# Patient Record
Sex: FEMALE | Race: WHITE | NOT HISPANIC OR LATINO | URBAN - METROPOLITAN AREA
[De-identification: names, ages, dates, MRNs, and addresses within clinical notes are randomized per-mention and may not be internally consistent; named-entity substitution may affect disease eponyms.]

---

## 2017-04-03 ENCOUNTER — OUTPATIENT (OUTPATIENT)
Dept: OUTPATIENT SERVICES | Facility: HOSPITAL | Age: 72
LOS: 1 days | Discharge: HOME | End: 2017-04-03
Payer: MEDICARE

## 2017-04-03 DIAGNOSIS — E89.0 POSTPROCEDURAL HYPOTHYROIDISM: ICD-10-CM

## 2017-04-03 DIAGNOSIS — E04.2 NONTOXIC MULTINODULAR GOITER: ICD-10-CM

## 2017-04-03 PROCEDURE — 76536 US EXAM OF HEAD AND NECK: CPT | Mod: 26

## 2023-11-18 ENCOUNTER — INPATIENT (INPATIENT)
Facility: HOSPITAL | Age: 78
LOS: 3 days | Discharge: NURSING, REHAB OR LONG-TERM CARE | DRG: 480 | End: 2023-11-22
Attending: HOSPITALIST | Admitting: STUDENT IN AN ORGANIZED HEALTH CARE EDUCATION/TRAINING PROGRAM
Payer: MEDICARE

## 2023-11-18 VITALS
HEART RATE: 77 BPM | OXYGEN SATURATION: 93 % | DIASTOLIC BLOOD PRESSURE: 87 MMHG | RESPIRATION RATE: 18 BRPM | SYSTOLIC BLOOD PRESSURE: 143 MMHG | TEMPERATURE: 98 F | WEIGHT: 139.99 LBS | HEIGHT: 67 IN

## 2023-11-18 DIAGNOSIS — K21.9 GASTRO-ESOPHAGEAL REFLUX DISEASE WITHOUT ESOPHAGITIS: ICD-10-CM

## 2023-11-18 LAB
ABO RH CONFIRMATION: SIGNIFICANT CHANGE UP
ABO RH CONFIRMATION: SIGNIFICANT CHANGE UP
ALBUMIN SERPL ELPH-MCNC: 3.4 G/DL — SIGNIFICANT CHANGE UP (ref 3.3–5)
ALBUMIN SERPL ELPH-MCNC: 3.4 G/DL — SIGNIFICANT CHANGE UP (ref 3.3–5)
ALP SERPL-CCNC: 77 U/L — SIGNIFICANT CHANGE UP (ref 40–120)
ALP SERPL-CCNC: 77 U/L — SIGNIFICANT CHANGE UP (ref 40–120)
ALT FLD-CCNC: 11 U/L — LOW (ref 12–78)
ALT FLD-CCNC: 11 U/L — LOW (ref 12–78)
ANION GAP SERPL CALC-SCNC: 5 MMOL/L — SIGNIFICANT CHANGE UP (ref 5–17)
ANION GAP SERPL CALC-SCNC: 5 MMOL/L — SIGNIFICANT CHANGE UP (ref 5–17)
APTT BLD: 29.7 SEC — SIGNIFICANT CHANGE UP (ref 24.5–35.6)
APTT BLD: 29.7 SEC — SIGNIFICANT CHANGE UP (ref 24.5–35.6)
AST SERPL-CCNC: 11 U/L — LOW (ref 15–37)
AST SERPL-CCNC: 11 U/L — LOW (ref 15–37)
BASOPHILS # BLD AUTO: 0.03 K/UL — SIGNIFICANT CHANGE UP (ref 0–0.2)
BASOPHILS # BLD AUTO: 0.03 K/UL — SIGNIFICANT CHANGE UP (ref 0–0.2)
BASOPHILS NFR BLD AUTO: 0.2 % — SIGNIFICANT CHANGE UP (ref 0–2)
BASOPHILS NFR BLD AUTO: 0.2 % — SIGNIFICANT CHANGE UP (ref 0–2)
BILIRUB SERPL-MCNC: 0.3 MG/DL — SIGNIFICANT CHANGE UP (ref 0.2–1.2)
BILIRUB SERPL-MCNC: 0.3 MG/DL — SIGNIFICANT CHANGE UP (ref 0.2–1.2)
BLD GP AB SCN SERPL QL: SIGNIFICANT CHANGE UP
BLD GP AB SCN SERPL QL: SIGNIFICANT CHANGE UP
BUN SERPL-MCNC: 18 MG/DL — SIGNIFICANT CHANGE UP (ref 7–23)
BUN SERPL-MCNC: 18 MG/DL — SIGNIFICANT CHANGE UP (ref 7–23)
CALCIUM SERPL-MCNC: 9 MG/DL — SIGNIFICANT CHANGE UP (ref 8.5–10.1)
CALCIUM SERPL-MCNC: 9 MG/DL — SIGNIFICANT CHANGE UP (ref 8.5–10.1)
CHLORIDE SERPL-SCNC: 106 MMOL/L — SIGNIFICANT CHANGE UP (ref 96–108)
CHLORIDE SERPL-SCNC: 106 MMOL/L — SIGNIFICANT CHANGE UP (ref 96–108)
CO2 SERPL-SCNC: 27 MMOL/L — SIGNIFICANT CHANGE UP (ref 22–31)
CO2 SERPL-SCNC: 27 MMOL/L — SIGNIFICANT CHANGE UP (ref 22–31)
CREAT SERPL-MCNC: 0.79 MG/DL — SIGNIFICANT CHANGE UP (ref 0.5–1.3)
CREAT SERPL-MCNC: 0.79 MG/DL — SIGNIFICANT CHANGE UP (ref 0.5–1.3)
EGFR: 77 ML/MIN/1.73M2 — SIGNIFICANT CHANGE UP
EGFR: 77 ML/MIN/1.73M2 — SIGNIFICANT CHANGE UP
EOSINOPHIL # BLD AUTO: 0.06 K/UL — SIGNIFICANT CHANGE UP (ref 0–0.5)
EOSINOPHIL # BLD AUTO: 0.06 K/UL — SIGNIFICANT CHANGE UP (ref 0–0.5)
EOSINOPHIL NFR BLD AUTO: 0.4 % — SIGNIFICANT CHANGE UP (ref 0–6)
EOSINOPHIL NFR BLD AUTO: 0.4 % — SIGNIFICANT CHANGE UP (ref 0–6)
GLUCOSE SERPL-MCNC: 149 MG/DL — HIGH (ref 70–99)
GLUCOSE SERPL-MCNC: 149 MG/DL — HIGH (ref 70–99)
HCT VFR BLD CALC: 40.4 % — SIGNIFICANT CHANGE UP (ref 34.5–45)
HCT VFR BLD CALC: 40.4 % — SIGNIFICANT CHANGE UP (ref 34.5–45)
HGB BLD-MCNC: 13.4 G/DL — SIGNIFICANT CHANGE UP (ref 11.5–15.5)
HGB BLD-MCNC: 13.4 G/DL — SIGNIFICANT CHANGE UP (ref 11.5–15.5)
IMM GRANULOCYTES NFR BLD AUTO: 0.6 % — SIGNIFICANT CHANGE UP (ref 0–0.9)
IMM GRANULOCYTES NFR BLD AUTO: 0.6 % — SIGNIFICANT CHANGE UP (ref 0–0.9)
INR BLD: 1.01 RATIO — SIGNIFICANT CHANGE UP (ref 0.85–1.18)
INR BLD: 1.01 RATIO — SIGNIFICANT CHANGE UP (ref 0.85–1.18)
LACTATE SERPL-SCNC: 1.2 MMOL/L — SIGNIFICANT CHANGE UP (ref 0.7–2)
LACTATE SERPL-SCNC: 1.2 MMOL/L — SIGNIFICANT CHANGE UP (ref 0.7–2)
LYMPHOCYTES # BLD AUTO: 0.91 K/UL — LOW (ref 1–3.3)
LYMPHOCYTES # BLD AUTO: 0.91 K/UL — LOW (ref 1–3.3)
LYMPHOCYTES # BLD AUTO: 6.3 % — LOW (ref 13–44)
LYMPHOCYTES # BLD AUTO: 6.3 % — LOW (ref 13–44)
MCHC RBC-ENTMCNC: 29.4 PG — SIGNIFICANT CHANGE UP (ref 27–34)
MCHC RBC-ENTMCNC: 29.4 PG — SIGNIFICANT CHANGE UP (ref 27–34)
MCHC RBC-ENTMCNC: 33.2 GM/DL — SIGNIFICANT CHANGE UP (ref 32–36)
MCHC RBC-ENTMCNC: 33.2 GM/DL — SIGNIFICANT CHANGE UP (ref 32–36)
MCV RBC AUTO: 88.6 FL — SIGNIFICANT CHANGE UP (ref 80–100)
MCV RBC AUTO: 88.6 FL — SIGNIFICANT CHANGE UP (ref 80–100)
MONOCYTES # BLD AUTO: 0.65 K/UL — SIGNIFICANT CHANGE UP (ref 0–0.9)
MONOCYTES # BLD AUTO: 0.65 K/UL — SIGNIFICANT CHANGE UP (ref 0–0.9)
MONOCYTES NFR BLD AUTO: 4.5 % — SIGNIFICANT CHANGE UP (ref 2–14)
MONOCYTES NFR BLD AUTO: 4.5 % — SIGNIFICANT CHANGE UP (ref 2–14)
NEUTROPHILS # BLD AUTO: 12.79 K/UL — HIGH (ref 1.8–7.4)
NEUTROPHILS # BLD AUTO: 12.79 K/UL — HIGH (ref 1.8–7.4)
NEUTROPHILS NFR BLD AUTO: 88 % — HIGH (ref 43–77)
NEUTROPHILS NFR BLD AUTO: 88 % — HIGH (ref 43–77)
PLATELET # BLD AUTO: 307 K/UL — SIGNIFICANT CHANGE UP (ref 150–400)
PLATELET # BLD AUTO: 307 K/UL — SIGNIFICANT CHANGE UP (ref 150–400)
POTASSIUM SERPL-MCNC: 3.8 MMOL/L — SIGNIFICANT CHANGE UP (ref 3.5–5.3)
POTASSIUM SERPL-MCNC: 3.8 MMOL/L — SIGNIFICANT CHANGE UP (ref 3.5–5.3)
POTASSIUM SERPL-SCNC: 3.8 MMOL/L — SIGNIFICANT CHANGE UP (ref 3.5–5.3)
POTASSIUM SERPL-SCNC: 3.8 MMOL/L — SIGNIFICANT CHANGE UP (ref 3.5–5.3)
PROT SERPL-MCNC: 7.5 GM/DL — SIGNIFICANT CHANGE UP (ref 6–8.3)
PROT SERPL-MCNC: 7.5 GM/DL — SIGNIFICANT CHANGE UP (ref 6–8.3)
PROTHROM AB SERPL-ACNC: 11.4 SEC — SIGNIFICANT CHANGE UP (ref 9.5–13)
PROTHROM AB SERPL-ACNC: 11.4 SEC — SIGNIFICANT CHANGE UP (ref 9.5–13)
RBC # BLD: 4.56 M/UL — SIGNIFICANT CHANGE UP (ref 3.8–5.2)
RBC # BLD: 4.56 M/UL — SIGNIFICANT CHANGE UP (ref 3.8–5.2)
RBC # FLD: 13.7 % — SIGNIFICANT CHANGE UP (ref 10.3–14.5)
RBC # FLD: 13.7 % — SIGNIFICANT CHANGE UP (ref 10.3–14.5)
SODIUM SERPL-SCNC: 138 MMOL/L — SIGNIFICANT CHANGE UP (ref 135–145)
SODIUM SERPL-SCNC: 138 MMOL/L — SIGNIFICANT CHANGE UP (ref 135–145)
TROPONIN I, HIGH SENSITIVITY RESULT: 6.22 NG/L — SIGNIFICANT CHANGE UP
TROPONIN I, HIGH SENSITIVITY RESULT: 6.22 NG/L — SIGNIFICANT CHANGE UP
WBC # BLD: 14.52 K/UL — HIGH (ref 3.8–10.5)
WBC # BLD: 14.52 K/UL — HIGH (ref 3.8–10.5)
WBC # FLD AUTO: 14.52 K/UL — HIGH (ref 3.8–10.5)
WBC # FLD AUTO: 14.52 K/UL — HIGH (ref 3.8–10.5)

## 2023-11-18 PROCEDURE — 86923 COMPATIBILITY TEST ELECTRIC: CPT

## 2023-11-18 PROCEDURE — 76376 3D RENDER W/INTRP POSTPROCES: CPT | Mod: 26

## 2023-11-18 PROCEDURE — 87635 SARS-COV-2 COVID-19 AMP PRB: CPT

## 2023-11-18 PROCEDURE — 73562 X-RAY EXAM OF KNEE 3: CPT | Mod: 26,RT

## 2023-11-18 PROCEDURE — 85610 PROTHROMBIN TIME: CPT

## 2023-11-18 PROCEDURE — 85014 HEMATOCRIT: CPT

## 2023-11-18 PROCEDURE — 97166 OT EVAL MOD COMPLEX 45 MIN: CPT | Mod: GO

## 2023-11-18 PROCEDURE — 86803 HEPATITIS C AB TEST: CPT

## 2023-11-18 PROCEDURE — 85730 THROMBOPLASTIN TIME PARTIAL: CPT

## 2023-11-18 PROCEDURE — 85018 HEMOGLOBIN: CPT

## 2023-11-18 PROCEDURE — 76000 FLUOROSCOPY <1 HR PHYS/QHP: CPT

## 2023-11-18 PROCEDURE — 83036 HEMOGLOBIN GLYCOSYLATED A1C: CPT

## 2023-11-18 PROCEDURE — 93010 ELECTROCARDIOGRAM REPORT: CPT

## 2023-11-18 PROCEDURE — 82040 ASSAY OF SERUM ALBUMIN: CPT

## 2023-11-18 PROCEDURE — 73501 X-RAY EXAM HIP UNI 1 VIEW: CPT | Mod: 26,RT,76

## 2023-11-18 PROCEDURE — 86901 BLOOD TYPING SEROLOGIC RH(D): CPT

## 2023-11-18 PROCEDURE — 85027 COMPLETE CBC AUTOMATED: CPT

## 2023-11-18 PROCEDURE — 99285 EMERGENCY DEPT VISIT HI MDM: CPT

## 2023-11-18 PROCEDURE — 72192 CT PELVIS W/O DYE: CPT | Mod: 26,MA

## 2023-11-18 PROCEDURE — 97530 THERAPEUTIC ACTIVITIES: CPT | Mod: GP

## 2023-11-18 PROCEDURE — 99222 1ST HOSP IP/OBS MODERATE 55: CPT

## 2023-11-18 PROCEDURE — 82306 VITAMIN D 25 HYDROXY: CPT

## 2023-11-18 PROCEDURE — 36430 TRANSFUSION BLD/BLD COMPNT: CPT

## 2023-11-18 PROCEDURE — 73501 X-RAY EXAM HIP UNI 1 VIEW: CPT | Mod: RT

## 2023-11-18 PROCEDURE — 86900 BLOOD TYPING SEROLOGIC ABO: CPT

## 2023-11-18 PROCEDURE — 36415 COLL VENOUS BLD VENIPUNCTURE: CPT

## 2023-11-18 PROCEDURE — 97116 GAIT TRAINING THERAPY: CPT | Mod: GP

## 2023-11-18 PROCEDURE — 97162 PT EVAL MOD COMPLEX 30 MIN: CPT | Mod: GP

## 2023-11-18 PROCEDURE — P9040: CPT

## 2023-11-18 PROCEDURE — 86850 RBC ANTIBODY SCREEN: CPT

## 2023-11-18 PROCEDURE — 80048 BASIC METABOLIC PNL TOTAL CA: CPT

## 2023-11-18 PROCEDURE — 71045 X-RAY EXAM CHEST 1 VIEW: CPT | Mod: 26

## 2023-11-18 PROCEDURE — 73552 X-RAY EXAM OF FEMUR 2/>: CPT | Mod: 26,RT

## 2023-11-18 PROCEDURE — C1713: CPT

## 2023-11-18 RX ORDER — METOPROLOL TARTRATE 50 MG
50 TABLET ORAL DAILY
Refills: 0 | Status: DISCONTINUED | OUTPATIENT
Start: 2023-11-18 | End: 2023-11-22

## 2023-11-18 RX ORDER — DONEPEZIL HYDROCHLORIDE 10 MG/1
10 TABLET, FILM COATED ORAL AT BEDTIME
Refills: 0 | Status: DISCONTINUED | OUTPATIENT
Start: 2023-11-18 | End: 2023-11-22

## 2023-11-18 RX ORDER — MORPHINE SULFATE 50 MG/1
2 CAPSULE, EXTENDED RELEASE ORAL ONCE
Refills: 0 | Status: DISCONTINUED | OUTPATIENT
Start: 2023-11-18 | End: 2023-11-18

## 2023-11-18 RX ORDER — METOPROLOL TARTRATE 50 MG
1 TABLET ORAL
Refills: 0 | DISCHARGE

## 2023-11-18 RX ORDER — PREGABALIN 225 MG/1
500 CAPSULE ORAL DAILY
Refills: 0 | Status: DISCONTINUED | OUTPATIENT
Start: 2023-11-18 | End: 2023-11-22

## 2023-11-18 RX ORDER — SODIUM CHLORIDE 9 MG/ML
1000 INJECTION INTRAMUSCULAR; INTRAVENOUS; SUBCUTANEOUS
Refills: 0 | Status: DISCONTINUED | OUTPATIENT
Start: 2023-11-18 | End: 2023-11-20

## 2023-11-18 RX ORDER — SERTRALINE 25 MG/1
1 TABLET, FILM COATED ORAL
Refills: 0 | DISCHARGE

## 2023-11-18 RX ORDER — FAMOTIDINE 10 MG/ML
1 INJECTION INTRAVENOUS
Refills: 0 | DISCHARGE

## 2023-11-18 RX ORDER — PREGABALIN 225 MG/1
1 CAPSULE ORAL
Refills: 0 | DISCHARGE

## 2023-11-18 RX ORDER — CHOLECALCIFEROL (VITAMIN D3) 125 MCG
1 CAPSULE ORAL
Refills: 0 | DISCHARGE

## 2023-11-18 RX ORDER — ERGOCALCIFEROL 1.25 MG/1
50000 CAPSULE ORAL
Refills: 0 | Status: DISCONTINUED | OUTPATIENT
Start: 2023-11-18 | End: 2023-11-22

## 2023-11-18 RX ORDER — DONEPEZIL HYDROCHLORIDE 10 MG/1
1 TABLET, FILM COATED ORAL
Refills: 0 | DISCHARGE

## 2023-11-18 RX ORDER — MORPHINE SULFATE 50 MG/1
2 CAPSULE, EXTENDED RELEASE ORAL EVERY 4 HOURS
Refills: 0 | Status: DISCONTINUED | OUTPATIENT
Start: 2023-11-18 | End: 2023-11-20

## 2023-11-18 RX ORDER — HEPARIN SODIUM 5000 [USP'U]/ML
5000 INJECTION INTRAVENOUS; SUBCUTANEOUS ONCE
Refills: 0 | Status: COMPLETED | OUTPATIENT
Start: 2023-11-18 | End: 2023-11-18

## 2023-11-18 RX ORDER — LEVOTHYROXINE SODIUM 125 MCG
1 TABLET ORAL
Refills: 0 | DISCHARGE

## 2023-11-18 RX ORDER — ACETAMINOPHEN 500 MG
1000 TABLET ORAL EVERY 8 HOURS
Refills: 0 | Status: DISCONTINUED | OUTPATIENT
Start: 2023-11-18 | End: 2023-11-20

## 2023-11-18 RX ORDER — MORPHINE SULFATE 50 MG/1
1 CAPSULE, EXTENDED RELEASE ORAL EVERY 4 HOURS
Refills: 0 | Status: DISCONTINUED | OUTPATIENT
Start: 2023-11-18 | End: 2023-11-20

## 2023-11-18 RX ORDER — LANOLIN ALCOHOL/MO/W.PET/CERES
3 CREAM (GRAM) TOPICAL AT BEDTIME
Refills: 0 | Status: DISCONTINUED | OUTPATIENT
Start: 2023-11-18 | End: 2023-11-19

## 2023-11-18 RX ORDER — FOLIC ACID 0.8 MG
1 TABLET ORAL
Refills: 0 | DISCHARGE

## 2023-11-18 RX ORDER — FAMOTIDINE 10 MG/ML
40 INJECTION INTRAVENOUS AT BEDTIME
Refills: 0 | Status: DISCONTINUED | OUTPATIENT
Start: 2023-11-18 | End: 2023-11-22

## 2023-11-18 RX ORDER — ERGOCALCIFEROL 1.25 MG/1
1 CAPSULE ORAL
Refills: 0 | DISCHARGE

## 2023-11-18 RX ORDER — POLYETHYLENE GLYCOL 3350 17 G/17G
17 POWDER, FOR SOLUTION ORAL DAILY
Refills: 0 | Status: DISCONTINUED | OUTPATIENT
Start: 2023-11-18 | End: 2023-11-19

## 2023-11-18 RX ORDER — NALOXONE HYDROCHLORIDE 4 MG/.1ML
0.4 SPRAY NASAL ONCE
Refills: 0 | Status: DISCONTINUED | OUTPATIENT
Start: 2023-11-18 | End: 2023-11-22

## 2023-11-18 RX ORDER — CALCIUM CARBONATE 500(1250)
2 TABLET ORAL DAILY
Refills: 0 | Status: DISCONTINUED | OUTPATIENT
Start: 2023-11-18 | End: 2023-11-22

## 2023-11-18 RX ORDER — FOLIC ACID 0.8 MG
1 TABLET ORAL DAILY
Refills: 0 | Status: DISCONTINUED | OUTPATIENT
Start: 2023-11-18 | End: 2023-11-22

## 2023-11-18 RX ORDER — SERTRALINE 25 MG/1
100 TABLET, FILM COATED ORAL DAILY
Refills: 0 | Status: DISCONTINUED | OUTPATIENT
Start: 2023-11-18 | End: 2023-11-22

## 2023-11-18 RX ORDER — SENNA PLUS 8.6 MG/1
2 TABLET ORAL AT BEDTIME
Refills: 0 | Status: DISCONTINUED | OUTPATIENT
Start: 2023-11-18 | End: 2023-11-19

## 2023-11-18 RX ORDER — ACETAMINOPHEN 500 MG
1 TABLET ORAL
Refills: 0 | DISCHARGE

## 2023-11-18 RX ORDER — LEVOTHYROXINE SODIUM 125 MCG
50 TABLET ORAL DAILY
Refills: 0 | Status: DISCONTINUED | OUTPATIENT
Start: 2023-11-18 | End: 2023-11-22

## 2023-11-18 RX ORDER — ONDANSETRON 8 MG/1
4 TABLET, FILM COATED ORAL EVERY 8 HOURS
Refills: 0 | Status: DISCONTINUED | OUTPATIENT
Start: 2023-11-18 | End: 2023-11-22

## 2023-11-18 RX ADMIN — SODIUM CHLORIDE 75 MILLILITER(S): 9 INJECTION INTRAMUSCULAR; INTRAVENOUS; SUBCUTANEOUS at 23:35

## 2023-11-18 RX ADMIN — MORPHINE SULFATE 2 MILLIGRAM(S): 50 CAPSULE, EXTENDED RELEASE ORAL at 15:08

## 2023-11-18 RX ADMIN — HEPARIN SODIUM 5000 UNIT(S): 5000 INJECTION INTRAVENOUS; SUBCUTANEOUS at 20:12

## 2023-11-18 RX ADMIN — MORPHINE SULFATE 2 MILLIGRAM(S): 50 CAPSULE, EXTENDED RELEASE ORAL at 18:56

## 2023-11-18 RX ADMIN — SODIUM CHLORIDE 75 MILLILITER(S): 9 INJECTION INTRAMUSCULAR; INTRAVENOUS; SUBCUTANEOUS at 20:12

## 2023-11-18 NOTE — ED ADULT TRIAGE NOTE - CHIEF COMPLAINT QUOTE
Pt presents to er with complaints of mechanical fall out of bed onto her right hip/leg, denies head strike/loc, use of AC, pt denies abd pain, chest pain, sob at this time, pt normally is independent and walks with rolling walker from facility.

## 2023-11-18 NOTE — ED ADULT NURSE NOTE - OBJECTIVE STATEMENT
pt presents to ED s/p fall today. unclear how she fell. denies headstrike, blood thinners. c/o R upper leg pain. was not ambulatory after fall. GCS 14. confused about events of fall.

## 2023-11-18 NOTE — ED PROVIDER NOTE - CLINICAL SUMMARY MEDICAL DECISION MAKING FREE TEXT BOX
Elderly female with dementia comes in found down at the [facility]. Clinically appears to have fractured hip. Vitals normal. No signs of head injury. XR, labs, meds, anticipate ortho admission.

## 2023-11-18 NOTE — ED PROVIDER NOTE - OBJECTIVE STATEMENT
77 yo female w/PMHx of OP, GERD, HTN, dementia presents to the ED BIBEMS from [facility] s/p fall. Pt usually is able to ambulate with a cane. Pt was getting out of bed when she fell. Pt c/o right hip pain Allergies to penicillin. Pt is DNR. Pt is not on blood thinners. Pt is on ASA as needed but is not on [facility]'s daily med list. Denies head strike, LOC. Denies CP, nausea, vomiting or any other symptoms.

## 2023-11-18 NOTE — PHARMACOTHERAPY INTERVENTION NOTE - COMMENTS
Medication history complete. Medications and allergies reviewed with patient and confirmed with .  Medication history complete. Medications and allergies reviewed with list provided by [facility] and confirmed with .

## 2023-11-18 NOTE — H&P ADULT - HISTORY OF PRESENT ILLNESS
79 yo F w/ PMHx HTN, dementia, osteoporosis, GERD presents to the ER from [facility] after a likely mechanical fall. As per documents witnessed fal lout of bed however patient does not remember how it occurred. She states at home she usually does not need to use a cane but sometimes she has to. She currently states pain is well controlled, denies any other complaints now or at home including chest pain, sob, nausea/vomiting, dizziness, palpitations.    Surg hx: unknown  Social hx: no alcohol, tobacco or drug use  Fam hx: unknown

## 2023-11-18 NOTE — H&P ADULT - NSHPPHYSICALEXAM_GEN_ALL_CORE
Physical Exam  General: no acute distress  HEENT: PERRLA  Skin: dry, warm, no rashes  Neuro: aaox3, cranial nerves 1-12 wnl, no focal deficits   Neck: no masses or deformities  Cardiac: positive S1 and S2, regular rate and rhythm, no gallops/rubs/murmurs  Pulmonary: normal work of breathing, lungs clear to auscultation bilaterally  Abdomen: soft, nondistended, nontender  Extremities: no significant edema or varicosities, 2+ palpable pulses bilaterally  MSK: RLE EXTERNALLY ROTATE TENDER TO PALPATION   Psychiatry: appropriate affect

## 2023-11-18 NOTE — H&P ADULT - NSHPREVIEWOFSYSTEMS_GEN_ALL_CORE
General: no fatigue or weakness    Ophthalmologic: normal vision, no blurring or double vision  	  ENMT: no cough, throat pain, no tinnitus or decreased hearing	    Respiratory and Thorax: no shortness of breath, no chest pain  	  Cardiovascular: no chest pain no palpitations    Gastrointestinal: no nausea or vomiting, no diarrhea, no constipation. no abdominal pain    Genitourinary: no dysuria, no urinary symptoms    Musculoskeletal: +RLE PAIN, no weakness    Neurological: no dizziness, no headache	    Psychiatric: no anxiety or depression	    Hematology/Lymphatics: no fevers, no night sweats or weight loss

## 2023-11-18 NOTE — ED ADULT NURSE NOTE - NSFALLRISKINTERV_ED_ALL_ED

## 2023-11-18 NOTE — H&P ADULT - ASSESSMENT
HPI:  77 yo F w/ PMHx HTN, dementia, osteoporosis, GERD presents to the ER from [facility] after a likely mechanical fall. As per documents witnessed fal lout of bed however patient does not remember how it occurred. She states at home she usually does not need to use a cane but sometimes she has to. She currently states pain is well controlled, denies any other complaints now or at home including chest pain, sob, nausea/vomiting, dizziness, palpitations.    Surg hx: unknown  Social hx: no alcohol, tobacco or drug use  Fam hx: unknown (18 Nov 2023 22:20)      PAST MEDICAL & SURGICAL HISTORY:  GERD (gastroesophageal reflux disease)      MEDICATIONS  (STANDING):  acetaminophen     Tablet .. 1000 milliGRAM(s) Oral every 8 hours  calcium carbonate    500 mG (Tums) Chewable 2 Tablet(s) Chew daily  cyanocobalamin 500 MICROGram(s) Oral daily  donepezil 10 milliGRAM(s) Oral at bedtime  ergocalciferol 27661 Unit(s) Oral every week  famotidine    Tablet 40 milliGRAM(s) Oral at bedtime  folic acid 1 milliGRAM(s) Oral daily  levothyroxine 50 MICROGram(s) Oral daily  metoprolol succinate ER 50 milliGRAM(s) Oral daily  naloxone Injectable 0.4 milliGRAM(s) IV Push once  polyethylene glycol 3350 17 Gram(s) Oral daily  senna 2 Tablet(s) Oral at bedtime  sertraline 100 milliGRAM(s) Oral daily  sodium chloride 0.9%. 1000 milliLiter(s) (75 mL/Hr) IV Continuous <Continuous>    MEDICATIONS  (PRN):  aluminum hydroxide/magnesium hydroxide/simethicone Suspension 30 milliLiter(s) Oral every 4 hours PRN Dyspepsia  bisacodyl 5 milliGRAM(s) Oral daily PRN Constipation  melatonin 3 milliGRAM(s) Oral at bedtime PRN Insomnia  morphine  - Injectable 1 milliGRAM(s) IV Push every 4 hours PRN Moderate Pain (4 - 6)  morphine  - Injectable 2 milliGRAM(s) IV Push every 4 hours PRN Severe Pain (7 - 10)  ondansetron Injectable 4 milliGRAM(s) IV Push every 8 hours PRN Nausea and/or Vomiting      Allergies    penicillins (Unknown)    Intolerances        SOCIAL HISTORY:    FAMILY HISTORY:      Vital Signs Last 24 Hrs  T(C): 37.1 (18 Nov 2023 20:50), Max: 37.1 (18 Nov 2023 20:50)  T(F): 98.8 (18 Nov 2023 20:50), Max: 98.8 (18 Nov 2023 20:50)  HR: 88 (18 Nov 2023 20:50) (77 - 88)  BP: 119/79 (18 Nov 2023 20:50) (111/82 - 143/87)  BP(mean): 82 (18 Nov 2023 20:21) (82 - 89)  RR: 17 (18 Nov 2023 20:50) (16 - 18)  SpO2: 93% (18 Nov 2023 20:50) (93% - 93%)    Parameters below as of 18 Nov 2023 20:50  Patient On (Oxygen Delivery Method): room air      LABS:                        13.4   14.52 )-----------( 307      ( 18 Nov 2023 14:53 )             40.4     11-18    138  |  106  |  18  ----------------------------<  149<H>  3.8   |  27  |  0.79    Ca    9.0      18 Nov 2023 14:53    TPro  7.5  /  Alb  3.4  /  TBili  0.3  /  DBili  x   /  AST  11<L>  /  ALT  11<L>  /  AlkPhos  77  11-18    PT/INR - ( 18 Nov 2023 14:53 )   PT: 11.4 sec;   INR: 1.01 ratio         PTT - ( 18 Nov 2023 14:53 )  PTT:29.7 sec  Urinalysis Basic - ( 18 Nov 2023 14:53 )    Color: x / Appearance: x / SG: x / pH: x  Gluc: 149 mg/dL / Ketone: x  / Bili: x / Urobili: x   Blood: x / Protein: x / Nitrite: x   Leuk Esterase: x / RBC: x / WBC x   Sq Epi: x / Non Sq Epi: x / Bacteria: x        RADIOLOGY & ADDITIONAL STUDIES:    EKG: NSR LAE nonspecific TWI     Plan:    1. MSK  -R hip fracture s/p mechanical fall out of bed planned for IMN in AM with orthopedics. METS<4 as patient is partially dependent and ambulates with cane at baseline, gerisensitive RCRI estimates perioperative risk of MACE at 0.3%, EKG reviewed, patient is low risk for intermediate risk procedure and may proceed without further testing. Will need temporary DNR reversal by surgical team   -pain control with tylenol and prn morphine  -heparin subq held after midnight, npo after midnight, bowel regimen while on opiate meds    2. HTN  -controlled on home dose toprol 50mg daily    3. Hypothyroidism; clinically euhtyroid cont home dose synthroid 50mcg daily    4. Neuro  -hx of dementia and depression, cont home dose aricept and zoloft    5. GERD  -controlled on H2 blockers famotidine 40mg daily     6. Leukocytosis likely reactive in setting of hip fracture, no clinical s/s of infection will monitor     DVT prophylaxis held until surgery, post op as per surgical team  NPO after midnight, cardiac diet after surgery  dispo: [facility] after cleared by PT    HPI:  79 yo F w/ PMHx HTN, dementia, osteoporosis, GERD presents to the ER from [facility] after a likely mechanical fall. As per documents witnessed fal lout of bed however patient does not remember how it occurred. She states at home she usually does not need to use a cane but sometimes she has to. She currently states pain is well controlled, denies any other complaints now or at home including chest pain, sob, nausea/vomiting, dizziness, palpitations.    Surg hx: unknown  Social hx: no alcohol, tobacco or drug use  Fam hx: unknown (18 Nov 2023 22:20)      PAST MEDICAL & SURGICAL HISTORY:  GERD (gastroesophageal reflux disease)      MEDICATIONS  (STANDING):  acetaminophen     Tablet .. 1000 milliGRAM(s) Oral every 8 hours  calcium carbonate    500 mG (Tums) Chewable 2 Tablet(s) Chew daily  cyanocobalamin 500 MICROGram(s) Oral daily  donepezil 10 milliGRAM(s) Oral at bedtime  ergocalciferol 03072 Unit(s) Oral every week  famotidine    Tablet 40 milliGRAM(s) Oral at bedtime  folic acid 1 milliGRAM(s) Oral daily  levothyroxine 50 MICROGram(s) Oral daily  metoprolol succinate ER 50 milliGRAM(s) Oral daily  naloxone Injectable 0.4 milliGRAM(s) IV Push once  polyethylene glycol 3350 17 Gram(s) Oral daily  senna 2 Tablet(s) Oral at bedtime  sertraline 100 milliGRAM(s) Oral daily  sodium chloride 0.9%. 1000 milliLiter(s) (75 mL/Hr) IV Continuous <Continuous>    MEDICATIONS  (PRN):  aluminum hydroxide/magnesium hydroxide/simethicone Suspension 30 milliLiter(s) Oral every 4 hours PRN Dyspepsia  bisacodyl 5 milliGRAM(s) Oral daily PRN Constipation  melatonin 3 milliGRAM(s) Oral at bedtime PRN Insomnia  morphine  - Injectable 1 milliGRAM(s) IV Push every 4 hours PRN Moderate Pain (4 - 6)  morphine  - Injectable 2 milliGRAM(s) IV Push every 4 hours PRN Severe Pain (7 - 10)  ondansetron Injectable 4 milliGRAM(s) IV Push every 8 hours PRN Nausea and/or Vomiting      Allergies    penicillins (Unknown)    Intolerances        SOCIAL HISTORY:    FAMILY HISTORY:      Vital Signs Last 24 Hrs  T(C): 37.1 (18 Nov 2023 20:50), Max: 37.1 (18 Nov 2023 20:50)  T(F): 98.8 (18 Nov 2023 20:50), Max: 98.8 (18 Nov 2023 20:50)  HR: 88 (18 Nov 2023 20:50) (77 - 88)  BP: 119/79 (18 Nov 2023 20:50) (111/82 - 143/87)  BP(mean): 82 (18 Nov 2023 20:21) (82 - 89)  RR: 17 (18 Nov 2023 20:50) (16 - 18)  SpO2: 93% (18 Nov 2023 20:50) (93% - 93%)    Parameters below as of 18 Nov 2023 20:50  Patient On (Oxygen Delivery Method): room air      LABS:                        13.4   14.52 )-----------( 307      ( 18 Nov 2023 14:53 )             40.4     11-18    138  |  106  |  18  ----------------------------<  149<H>  3.8   |  27  |  0.79    Ca    9.0      18 Nov 2023 14:53    TPro  7.5  /  Alb  3.4  /  TBili  0.3  /  DBili  x   /  AST  11<L>  /  ALT  11<L>  /  AlkPhos  77  11-18    PT/INR - ( 18 Nov 2023 14:53 )   PT: 11.4 sec;   INR: 1.01 ratio         PTT - ( 18 Nov 2023 14:53 )  PTT:29.7 sec  Urinalysis Basic - ( 18 Nov 2023 14:53 )    Color: x / Appearance: x / SG: x / pH: x  Gluc: 149 mg/dL / Ketone: x  / Bili: x / Urobili: x   Blood: x / Protein: x / Nitrite: x   Leuk Esterase: x / RBC: x / WBC x   Sq Epi: x / Non Sq Epi: x / Bacteria: x        RADIOLOGY & ADDITIONAL STUDIES:    EKG: NSR LAE nonspecific TWI     Plan:    1. MSK  -R hip fracture s/p mechanical fall out of bed planned for IMN in AM with orthopedics. METS<4 as patient is partially dependent and ambulates with cane at baseline, gerisensitive RCRI estimates perioperative risk of MACE at 0.3%, EKG reviewed, no murmur on exam, patient is low risk for intermediate risk procedure and may proceed without further testing. Will need temporary DNR reversal by surgical team   -pain control with tylenol and prn morphine  -heparin subq held after midnight, npo after midnight, bowel regimen while on opiate meds    2. HTN  -controlled on home dose toprol 50mg daily    3. Hypothyroidism; clinically euhtyroid cont home dose synthroid 50mcg daily    4. Neuro  -hx of dementia and depression, cont home dose aricept and zoloft    5. GERD  -controlled on H2 blockers famotidine 40mg daily     6. Leukocytosis likely reactive in setting of hip fracture, no clinical s/s of infection will monitor     DVT prophylaxis held until surgery, post op as per surgical team  NPO after midnight, cardiac diet after surgery  dispo: [facility] after cleared by PT

## 2023-11-18 NOTE — PATIENT PROFILE ADULT - FALL HARM RISK - HARM RISK INTERVENTIONS
Hip Pain  The hip is the joint between the upper legs and the lower pelvis. The bones, cartilage, tendons, and muscles of your hip joint support your body and allow you to move around.    Hip pain can range from a minor ache to severe pain in one or both of your hips. The pain may be felt on the inside of the hip joint near the groin, or on the outside near the buttocks and upper thigh. You may also have swelling or stiffness in your hip area.    Follow these instructions at home:  Managing pain, stiffness, and swelling        If directed, put ice on the painful area. To do this:  Put ice in a plastic bag.  Place a towel between your skin and the bag.  Leave the ice on for 20 minutes, 2–3 times a day.  If directed, apply heat to the affected area as often as told by your health care provider. Use the heat source that your health care provider recommends, such as a moist heat pack or a heating pad.  Place a towel between your skin and the heat source.  Leave the heat on for 20–30 minutes.  Remove the heat if your skin turns bright red. This is especially important if you are unable to feel pain, heat, or cold. You may have a greater risk of getting burned.  Activity    Do exercises as told by your health care provider.  Avoid activities that cause pain.  General instructions      Take over-the-counter and prescription medicines only as told by your health care provider.  Keep a journal of your symptoms. Write down:  How often you have hip pain.  The location of your pain.  What the pain feels like.  What makes the pain worse.  Sleep with a pillow between your legs on your most comfortable side.  Keep all follow-up visits as told by your health care provider. This is important.  Contact a health care provider if:  You cannot put weight on your leg.  Your pain or swelling continues or gets worse after one week.  It gets harder to walk.  You have a fever.  Get help right away if:  You fall.  You have a sudden increase in pain and swelling in your hip.  Your hip is red or swollen or very tender to touch.  Summary  Hip pain can range from a minor ache to severe pain in one or both of your hips.  The pain may be felt on the inside of the hip joint near the groin, or on the outside near the buttocks and upper thigh.  Avoid activities that cause pain.  Write down how often you have hip pain, the location of the pain, what makes it worse, and what it feels like.  This information is not intended to replace advice given to you by your health care provider. Make sure you discuss any questions you have with your health care provider. Assistance with ambulation/Assistance OOB with selected safe patient handling equipment/Communicate Risk of Fall with Harm to all staff/Discuss with provider need for PT consult/Monitor for mental status changes/Monitor gait and stability/Move patient closer to nurses' station/Provide patient with walking aids - walker, cane, crutches/Reinforce activity limits and safety measures with patient and family/Reorient to person, place and time as needed/Tailored Fall Risk Interventions/Toileting schedule using arm’s reach rule for commode and bathroom/Use of alarms - bed, chair and/or voice tab/Visual Cue: Yellow wristband and red socks/Bed in lowest position, wheels locked, appropriate side rails in place/Call bell, personal items and telephone in reach/Instruct patient to call for assistance before getting out of bed or chair/Non-slip footwear when patient is out of bed/[place] to call system/Physically safe environment - no spills, clutter or unnecessary equipment/Purposeful Proactive Rounding/Room/bathroom lighting operational, light cord in reach

## 2023-11-18 NOTE — H&P ADULT - VTE RISK ASSESSMENT
Click to launch

## 2023-11-18 NOTE — ED PROVIDER NOTE - MUSCULOSKELETAL, MLM
sitting in a crossed legged position and unable to get out of it due to pain. ttp right knee, right hip, right femur. RLE NVI

## 2023-11-18 NOTE — CONSULT NOTE ADULT - SUBJECTIVE AND OBJECTIVE BOX
78y Female minimal community ambulatory w/ walker and cane presents c/o R hip pain and inability to ambulate sp mechanical fall at [facility] unit today. Denies HS/LOC. Denies numbness/tingling. Denies fever/chills. Denies pain/injury elsewhere. Daughter / HCP at bedside, Alexandria Samson ([phone]). Denies anticoagulation. PMH of GERD, HTN, HypoTh      MEDICATIONS  (STANDING):  heparin   Injectable 5000 Unit(s) SubCutaneous once  sodium chloride 0.9%. 1000 milliLiter(s) IV Continuous <Continuous>    Allergies    penicillins (Unknown)    Intolerances                            13.4   14.52 )-----------( 307      ( 18 Nov 2023 14:53 )             40.4     18 Nov 2023 14:53    138    |  106    |  18     ----------------------------<  149    3.8     |  27     |  0.79     Ca    9.0        18 Nov 2023 14:53    TPro  7.5    /  Alb  3.4    /  TBili  0.3    /  DBili  x      /  AST  11     /  ALT  11     /  AlkPhos  77     18 Nov 2023 14:53    PT/INR - ( 18 Nov 2023 14:53 )   PT: 11.4 sec;   INR: 1.01 ratio         PTT - ( 18 Nov 2023 14:53 )  PTT:29.7 sec  Vital Signs Last 24 Hrs  T(C): 36.7 (11-18-23 @ 13:05), Max: 36.7 (11-18-23 @ 13:05)  T(F): 98.1 (11-18-23 @ 13:05), Max: 98.1 (11-18-23 @ 13:05)  HR: 77 (11-18-23 @ 13:05) (77 - 77)  BP: 143/87 (11-18-23 @ 13:05) (143/87 - 143/87)  BP(mean): --  RR: 18 (11-18-23 @ 13:05) (18 - 18)  SpO2: 93% (11-18-23 @ 13:05) (93% - 93%)  Imaging: XR demonstates R hip fracture    Physical Exam  General: NAD, Alert, Awake and oriented  Neurologic: No gross deficits, moving all 4s.  Head: NCAT without abrasions, lacerations, or ecchymosis to head, face, or scalp  Eyes: PERRL  Neck/C-Spine: FAROM. No bony TTP.  T/L Spine: No bony TTP, no palpable step-off.    HIPS and PELVIS: Unable to SLR R Hip    RLE: Skin intact,  +gross deformity  TTP over right hip  NTTP over the bony prominences of the knee/ankle/foot  Unable to range r hip  painless passive/active ROM of the knee/ankle/foot  L2-S1 SILT  motor grossly intact throughout hip flexors/quads/hams/TA/EHL/FHL/GSC  + DP/PT pulses  compartments soft and compressible, calves nontender      SECONDARY EXAM: Benign, Skin intact, SILT throughout, motor grossly intact throughout, no other orthopedic injuries at this time, compartments soft and compressible    SPINE: Skin intact, no bony tenderness or step-offs appreciated throughout cervical/thoracic/lumbar/sacral spine          A/P: 78y Female with R hip fracture    Plan for IMN  w/ Dr. Heller 11/19/23 in AM 0800  NPO after midnight   IVF while NPO  Hold chemical AC after midnight   CXR/EKG  Preop labs reviewed / ordered  CBC/BMP/PT/PTT/INR/T&S  Pain control  NWB RLE, bedrest  FU labs/imaging  Ca/Vit D  Outpt osteoporosis workup  Admit to medical team  Please document medical risk stratification for OR  Will discuss with attending

## 2023-11-18 NOTE — PATIENT PROFILE ADULT - FUNCTIONAL ASSESSMENT - BASIC MOBILITY 6.
2-calculated by average/Not able to assess (calculate score using [facility] averaging method)

## 2023-11-19 ENCOUNTER — TRANSCRIPTION ENCOUNTER (OUTPATIENT)
Age: 78
End: 2023-11-19

## 2023-11-19 LAB
ADD ON TEST-SPECIMEN IN LAB: SIGNIFICANT CHANGE UP
ADD ON TEST-SPECIMEN IN LAB: SIGNIFICANT CHANGE UP
ANION GAP SERPL CALC-SCNC: 5 MMOL/L — SIGNIFICANT CHANGE UP (ref 5–17)
ANION GAP SERPL CALC-SCNC: 5 MMOL/L — SIGNIFICANT CHANGE UP (ref 5–17)
ANION GAP SERPL CALC-SCNC: 7 MMOL/L — SIGNIFICANT CHANGE UP (ref 5–17)
ANION GAP SERPL CALC-SCNC: 7 MMOL/L — SIGNIFICANT CHANGE UP (ref 5–17)
APTT BLD: 27.7 SEC — SIGNIFICANT CHANGE UP (ref 24.5–35.6)
APTT BLD: 27.7 SEC — SIGNIFICANT CHANGE UP (ref 24.5–35.6)
BUN SERPL-MCNC: 27 MG/DL — HIGH (ref 7–23)
BUN SERPL-MCNC: 27 MG/DL — HIGH (ref 7–23)
BUN SERPL-MCNC: 28 MG/DL — HIGH (ref 7–23)
BUN SERPL-MCNC: 28 MG/DL — HIGH (ref 7–23)
CALCIUM SERPL-MCNC: 8.1 MG/DL — LOW (ref 8.5–10.1)
CALCIUM SERPL-MCNC: 8.1 MG/DL — LOW (ref 8.5–10.1)
CALCIUM SERPL-MCNC: 8.5 MG/DL — SIGNIFICANT CHANGE UP (ref 8.5–10.1)
CALCIUM SERPL-MCNC: 8.5 MG/DL — SIGNIFICANT CHANGE UP (ref 8.5–10.1)
CHLORIDE SERPL-SCNC: 107 MMOL/L — SIGNIFICANT CHANGE UP (ref 96–108)
CHLORIDE SERPL-SCNC: 107 MMOL/L — SIGNIFICANT CHANGE UP (ref 96–108)
CHLORIDE SERPL-SCNC: 109 MMOL/L — HIGH (ref 96–108)
CHLORIDE SERPL-SCNC: 109 MMOL/L — HIGH (ref 96–108)
CO2 SERPL-SCNC: 25 MMOL/L — SIGNIFICANT CHANGE UP (ref 22–31)
CO2 SERPL-SCNC: 25 MMOL/L — SIGNIFICANT CHANGE UP (ref 22–31)
CO2 SERPL-SCNC: 27 MMOL/L — SIGNIFICANT CHANGE UP (ref 22–31)
CO2 SERPL-SCNC: 27 MMOL/L — SIGNIFICANT CHANGE UP (ref 22–31)
CREAT SERPL-MCNC: 0.66 MG/DL — SIGNIFICANT CHANGE UP (ref 0.5–1.3)
CREAT SERPL-MCNC: 0.66 MG/DL — SIGNIFICANT CHANGE UP (ref 0.5–1.3)
CREAT SERPL-MCNC: 0.76 MG/DL — SIGNIFICANT CHANGE UP (ref 0.5–1.3)
CREAT SERPL-MCNC: 0.76 MG/DL — SIGNIFICANT CHANGE UP (ref 0.5–1.3)
EGFR: 81 ML/MIN/1.73M2 — SIGNIFICANT CHANGE UP
EGFR: 81 ML/MIN/1.73M2 — SIGNIFICANT CHANGE UP
EGFR: 90 ML/MIN/1.73M2 — SIGNIFICANT CHANGE UP
EGFR: 90 ML/MIN/1.73M2 — SIGNIFICANT CHANGE UP
GLUCOSE SERPL-MCNC: 137 MG/DL — HIGH (ref 70–99)
GLUCOSE SERPL-MCNC: 137 MG/DL — HIGH (ref 70–99)
GLUCOSE SERPL-MCNC: 188 MG/DL — HIGH (ref 70–99)
GLUCOSE SERPL-MCNC: 188 MG/DL — HIGH (ref 70–99)
HCT VFR BLD CALC: 31.7 % — LOW (ref 34.5–45)
HCT VFR BLD CALC: 31.7 % — LOW (ref 34.5–45)
HCT VFR BLD CALC: 36.8 % — SIGNIFICANT CHANGE UP (ref 34.5–45)
HCT VFR BLD CALC: 36.8 % — SIGNIFICANT CHANGE UP (ref 34.5–45)
HCV AB S/CO SERPL IA: 0.11 S/CO — SIGNIFICANT CHANGE UP (ref 0–0.99)
HCV AB S/CO SERPL IA: 0.11 S/CO — SIGNIFICANT CHANGE UP (ref 0–0.99)
HCV AB SERPL-IMP: SIGNIFICANT CHANGE UP
HCV AB SERPL-IMP: SIGNIFICANT CHANGE UP
HGB BLD-MCNC: 10.7 G/DL — LOW (ref 11.5–15.5)
HGB BLD-MCNC: 10.7 G/DL — LOW (ref 11.5–15.5)
HGB BLD-MCNC: 12.3 G/DL — SIGNIFICANT CHANGE UP (ref 11.5–15.5)
HGB BLD-MCNC: 12.3 G/DL — SIGNIFICANT CHANGE UP (ref 11.5–15.5)
INR BLD: 1.05 RATIO — SIGNIFICANT CHANGE UP (ref 0.85–1.18)
INR BLD: 1.05 RATIO — SIGNIFICANT CHANGE UP (ref 0.85–1.18)
MCHC RBC-ENTMCNC: 30 PG — SIGNIFICANT CHANGE UP (ref 27–34)
MCHC RBC-ENTMCNC: 30 PG — SIGNIFICANT CHANGE UP (ref 27–34)
MCHC RBC-ENTMCNC: 30.1 PG — SIGNIFICANT CHANGE UP (ref 27–34)
MCHC RBC-ENTMCNC: 30.1 PG — SIGNIFICANT CHANGE UP (ref 27–34)
MCHC RBC-ENTMCNC: 33.4 GM/DL — SIGNIFICANT CHANGE UP (ref 32–36)
MCHC RBC-ENTMCNC: 33.4 GM/DL — SIGNIFICANT CHANGE UP (ref 32–36)
MCHC RBC-ENTMCNC: 33.8 GM/DL — SIGNIFICANT CHANGE UP (ref 32–36)
MCHC RBC-ENTMCNC: 33.8 GM/DL — SIGNIFICANT CHANGE UP (ref 32–36)
MCV RBC AUTO: 89 FL — SIGNIFICANT CHANGE UP (ref 80–100)
MCV RBC AUTO: 89 FL — SIGNIFICANT CHANGE UP (ref 80–100)
MCV RBC AUTO: 89.8 FL — SIGNIFICANT CHANGE UP (ref 80–100)
MCV RBC AUTO: 89.8 FL — SIGNIFICANT CHANGE UP (ref 80–100)
PLATELET # BLD AUTO: 291 K/UL — SIGNIFICANT CHANGE UP (ref 150–400)
PLATELET # BLD AUTO: 291 K/UL — SIGNIFICANT CHANGE UP (ref 150–400)
PLATELET # BLD AUTO: 306 K/UL — SIGNIFICANT CHANGE UP (ref 150–400)
PLATELET # BLD AUTO: 306 K/UL — SIGNIFICANT CHANGE UP (ref 150–400)
POTASSIUM SERPL-MCNC: 3.6 MMOL/L — SIGNIFICANT CHANGE UP (ref 3.5–5.3)
POTASSIUM SERPL-MCNC: 3.6 MMOL/L — SIGNIFICANT CHANGE UP (ref 3.5–5.3)
POTASSIUM SERPL-MCNC: 3.8 MMOL/L — SIGNIFICANT CHANGE UP (ref 3.5–5.3)
POTASSIUM SERPL-MCNC: 3.8 MMOL/L — SIGNIFICANT CHANGE UP (ref 3.5–5.3)
POTASSIUM SERPL-SCNC: 3.6 MMOL/L — SIGNIFICANT CHANGE UP (ref 3.5–5.3)
POTASSIUM SERPL-SCNC: 3.6 MMOL/L — SIGNIFICANT CHANGE UP (ref 3.5–5.3)
POTASSIUM SERPL-SCNC: 3.8 MMOL/L — SIGNIFICANT CHANGE UP (ref 3.5–5.3)
POTASSIUM SERPL-SCNC: 3.8 MMOL/L — SIGNIFICANT CHANGE UP (ref 3.5–5.3)
PROTHROM AB SERPL-ACNC: 11.9 SEC — SIGNIFICANT CHANGE UP (ref 9.5–13)
PROTHROM AB SERPL-ACNC: 11.9 SEC — SIGNIFICANT CHANGE UP (ref 9.5–13)
RBC # BLD: 3.56 M/UL — LOW (ref 3.8–5.2)
RBC # BLD: 3.56 M/UL — LOW (ref 3.8–5.2)
RBC # BLD: 4.1 M/UL — SIGNIFICANT CHANGE UP (ref 3.8–5.2)
RBC # BLD: 4.1 M/UL — SIGNIFICANT CHANGE UP (ref 3.8–5.2)
RBC # FLD: 13.5 % — SIGNIFICANT CHANGE UP (ref 10.3–14.5)
SODIUM SERPL-SCNC: 139 MMOL/L — SIGNIFICANT CHANGE UP (ref 135–145)
SODIUM SERPL-SCNC: 139 MMOL/L — SIGNIFICANT CHANGE UP (ref 135–145)
SODIUM SERPL-SCNC: 141 MMOL/L — SIGNIFICANT CHANGE UP (ref 135–145)
SODIUM SERPL-SCNC: 141 MMOL/L — SIGNIFICANT CHANGE UP (ref 135–145)
WBC # BLD: 5.9 K/UL — SIGNIFICANT CHANGE UP (ref 3.8–10.5)
WBC # BLD: 5.9 K/UL — SIGNIFICANT CHANGE UP (ref 3.8–10.5)
WBC # BLD: 7.5 K/UL — SIGNIFICANT CHANGE UP (ref 3.8–10.5)
WBC # BLD: 7.5 K/UL — SIGNIFICANT CHANGE UP (ref 3.8–10.5)
WBC # FLD AUTO: 5.9 K/UL — SIGNIFICANT CHANGE UP (ref 3.8–10.5)
WBC # FLD AUTO: 5.9 K/UL — SIGNIFICANT CHANGE UP (ref 3.8–10.5)
WBC # FLD AUTO: 7.5 K/UL — SIGNIFICANT CHANGE UP (ref 3.8–10.5)
WBC # FLD AUTO: 7.5 K/UL — SIGNIFICANT CHANGE UP (ref 3.8–10.5)

## 2023-11-19 PROCEDURE — 99232 SBSQ HOSP IP/OBS MODERATE 35: CPT

## 2023-11-19 PROCEDURE — 73501 X-RAY EXAM HIP UNI 1 VIEW: CPT | Mod: 26,RT

## 2023-11-19 RX ORDER — FENTANYL CITRATE 50 UG/ML
50 INJECTION INTRAVENOUS
Refills: 0 | Status: DISCONTINUED | OUTPATIENT
Start: 2023-11-19 | End: 2023-11-19

## 2023-11-19 RX ORDER — OXYCODONE HYDROCHLORIDE 5 MG/1
5 TABLET ORAL ONCE
Refills: 0 | Status: DISCONTINUED | OUTPATIENT
Start: 2023-11-19 | End: 2023-11-19

## 2023-11-19 RX ORDER — POLYETHYLENE GLYCOL 3350 17 G/17G
17 POWDER, FOR SOLUTION ORAL DAILY
Refills: 0 | Status: DISCONTINUED | OUTPATIENT
Start: 2023-11-19 | End: 2023-11-22

## 2023-11-19 RX ORDER — SODIUM CHLORIDE 9 MG/ML
1000 INJECTION, SOLUTION INTRAVENOUS
Refills: 0 | Status: DISCONTINUED | OUTPATIENT
Start: 2023-11-19 | End: 2023-11-19

## 2023-11-19 RX ORDER — ENOXAPARIN SODIUM 100 MG/ML
40 INJECTION SUBCUTANEOUS EVERY 24 HOURS
Refills: 0 | Status: DISCONTINUED | OUTPATIENT
Start: 2023-11-20 | End: 2023-11-22

## 2023-11-19 RX ORDER — SENNA PLUS 8.6 MG/1
2 TABLET ORAL AT BEDTIME
Refills: 0 | Status: DISCONTINUED | OUTPATIENT
Start: 2023-11-19 | End: 2023-11-22

## 2023-11-19 RX ORDER — TRAMADOL HYDROCHLORIDE 50 MG/1
25 TABLET ORAL EVERY 6 HOURS
Refills: 0 | Status: DISCONTINUED | OUTPATIENT
Start: 2023-11-19 | End: 2023-11-22

## 2023-11-19 RX ORDER — MAGNESIUM HYDROXIDE 400 MG/1
30 TABLET, CHEWABLE ORAL DAILY
Refills: 0 | Status: DISCONTINUED | OUTPATIENT
Start: 2023-11-19 | End: 2023-11-22

## 2023-11-19 RX ORDER — OXYCODONE HYDROCHLORIDE 5 MG/1
5 TABLET ORAL EVERY 6 HOURS
Refills: 0 | Status: DISCONTINUED | OUTPATIENT
Start: 2023-11-19 | End: 2023-11-22

## 2023-11-19 RX ORDER — ACETAMINOPHEN 500 MG
650 TABLET ORAL EVERY 6 HOURS
Refills: 0 | Status: DISCONTINUED | OUTPATIENT
Start: 2023-11-19 | End: 2023-11-22

## 2023-11-19 RX ORDER — LANOLIN ALCOHOL/MO/W.PET/CERES
3 CREAM (GRAM) TOPICAL AT BEDTIME
Refills: 0 | Status: DISCONTINUED | OUTPATIENT
Start: 2023-11-19 | End: 2023-11-22

## 2023-11-19 RX ORDER — ONDANSETRON 8 MG/1
4 TABLET, FILM COATED ORAL ONCE
Refills: 0 | Status: DISCONTINUED | OUTPATIENT
Start: 2023-11-19 | End: 2023-11-19

## 2023-11-19 RX ORDER — OXYCODONE HYDROCHLORIDE 5 MG/1
2.5 TABLET ORAL EVERY 6 HOURS
Refills: 0 | Status: DISCONTINUED | OUTPATIENT
Start: 2023-11-19 | End: 2023-11-22

## 2023-11-19 RX ADMIN — Medication 3 MILLIGRAM(S): at 21:40

## 2023-11-19 RX ADMIN — Medication 50 MILLIGRAM(S): at 15:53

## 2023-11-19 RX ADMIN — Medication 650 MILLIGRAM(S): at 22:11

## 2023-11-19 RX ADMIN — Medication 1000 MILLIGRAM(S): at 15:53

## 2023-11-19 RX ADMIN — SENNA PLUS 2 TABLET(S): 8.6 TABLET ORAL at 21:39

## 2023-11-19 RX ADMIN — FENTANYL CITRATE 50 MICROGRAM(S): 50 INJECTION INTRAVENOUS at 09:56

## 2023-11-19 RX ADMIN — DONEPEZIL HYDROCHLORIDE 10 MILLIGRAM(S): 10 TABLET, FILM COATED ORAL at 21:41

## 2023-11-19 RX ADMIN — SERTRALINE 100 MILLIGRAM(S): 25 TABLET, FILM COATED ORAL at 15:54

## 2023-11-19 RX ADMIN — Medication 650 MILLIGRAM(S): at 21:41

## 2023-11-19 RX ADMIN — POLYETHYLENE GLYCOL 3350 17 GRAM(S): 17 POWDER, FOR SOLUTION ORAL at 15:53

## 2023-11-19 RX ADMIN — FAMOTIDINE 40 MILLIGRAM(S): 10 INJECTION INTRAVENOUS at 21:40

## 2023-11-19 NOTE — PROGRESS NOTE ADULT - ASSESSMENT
Closed R intertrochanteric fracture s/p mechanical fall   -S/p  IMN with Dr. Heller 11/19  -pain control with Tylenol and prn morphine  -resume AC as per ortho recommendations     HTN  -controlled on home dose toprol 50mg daily    Hypothyroidism  -clinically euthyroid cont home dose synthroid 50mcg daily    hx of dementia and depression, cont home dose Aricept and zoloft    GERD  -controlled on H2 blockers famotidine 40mg daily     Leukocytosis likely reactive in setting of hip fracture, no clinical s/s of infection will monitor       PT eval

## 2023-11-19 NOTE — DISCHARGE NOTE PROVIDER - DETAILS OF MALNUTRITION DIAGNOSIS/DIAGNOSES
This patient has been assessed with a concern for Malnutrition and was treated during this hospitalization for the following Nutrition diagnosis/diagnoses:     -  11/21/2023: Severe protein-calorie malnutrition   -  11/21/2023: Underweight (BMI < 19)

## 2023-11-19 NOTE — DISCHARGE NOTE PROVIDER - NSDCMRMEDTOKEN_GEN_ALL_CORE_FT
Calcitrate 950 mg (200 mg elemental calcium) oral tablet: 2 tab(s) orally once a day  cyanocobalamin 500 mcg oral tablet: 1 tab(s) orally once a day  donepezil 10 mg oral tablet: 1 tab(s) orally once a day  Drisdol 1.25 mg (50,000 intl units) oral capsule: 1 cap(s) orally once a week  famotidine 40 mg oral tablet: 1 tab(s) orally once a day  folic acid 0.8 mg oral tablet: 1 tab(s) orally once a day  levothyroxine 50 mcg (0.05 mg) oral tablet: 1 tab(s) orally once a day  metoprolol succinate 50 mg oral tablet, extended release: 1 tab(s) orally once a day  Tylenol 500 mg oral tablet: 1 tab(s) orally once a day  Zoloft 100 mg oral tablet: 1 tab(s) orally once a day   bisacodyl 5 mg oral delayed release tablet: 1 tab(s) orally once a day As needed Constipation  Calcitrate 950 mg (200 mg elemental calcium) oral tablet: 2 tab(s) orally once a day  Carafate 1 g oral tablet: 1 tab(s) orally 4 times a day  cyanocobalamin 500 mcg oral tablet: 1 tab(s) orally once a day  donepezil 10 mg oral tablet: 1 tab(s) orally once a day  Drisdol 1.25 mg (50,000 intl units) oral capsule: 1 cap(s) orally once a week  famotidine 40 mg oral tablet: 1 tab(s) orally once a day  folic acid 0.8 mg oral tablet: 1 tab(s) orally once a day  levothyroxine 50 mcg (0.05 mg) oral tablet: 1 tab(s) orally once a day  Lovenox 40 mg/0.4 mL injectable solution: 40 milligram(s) subcutaneously once a day  metoprolol succinate 50 mg oral tablet, extended release: 1 tab(s) orally once a day  polyethylene glycol 3350 oral powder for reconstitution: 17 gram(s) orally once a day  Protonix 40 mg oral delayed release tablet: 1 tab(s) orally once a day  senna leaf extract oral tablet: 2 tab(s) orally once a day (at bedtime)  traMADol 50 mg oral tablet: 0.5 tab(s) orally every 6 hours As needed Mild Pain (1 - 3)  Tylenol 500 mg oral tablet: 1 tab(s) orally once a day  Zoloft 100 mg oral tablet: 1 tab(s) orally once a day

## 2023-11-19 NOTE — DISCHARGE NOTE PROVIDER - CARE PROVIDER_API CALL
Paulo Heller [place]  Orthopaedic Surgery  [address] [place] [id number]  Phone: [phone]  Fax: [phone]  Follow Up Time:    Paulo Heller [place]  Orthopaedic Surgery  [address] [place] [id number]  Phone: [phone]  Fax: [phone]  Follow Up Time:     roni ceron  Phone: (   )    -  Fax: (   )    -  Follow Up Time:

## 2023-11-19 NOTE — DISCHARGE NOTE PROVIDER - NSDCCPTREATMENT_GEN_ALL_CORE_FT
PRINCIPAL PROCEDURE  Procedure: Nail fixation of intertrochanteric fracture  Findings and Treatment: Right

## 2023-11-19 NOTE — BRIEF OPERATIVE NOTE - NSICDXBRIEFPREOP_GEN_ALL_CORE_FT
PRE-OP DIAGNOSIS:  Closed intertrochanteric fracture of right hip 19-Nov-2023 09:42:37  Pablito Vo

## 2023-11-19 NOTE — CHART NOTE - NSCHARTNOTEFT_GEN_A_CORE
Patient just voided using PrimaFit, voided 100 cc.   However, she still has suprapubic pain and feels like she needs to go more. Bladder scan showed 390 cc.  Given patient will be planned for OR this morning, ordered Espinosa.    Aryles Hedjar, MD  Covering Hospitalist

## 2023-11-19 NOTE — DISCHARGE NOTE PROVIDER - HOSPITAL COURSE
79 yo F w/ PMHx HTN, dementia, osteoporosis, GERD presents to the ER from [facility] after a likely mechanical fall. As per documents witnessed fall out of bed however patient does not remember how it occurred. She states at home she usually does not need to use a cane but sometimes she has to. She currently states pain is well controlled, denies any other complaints now or at home including chest pain, sob, nausea/vomiting, dizziness, palpitations.    Interval Hx: Patient seen and examined today, awake, alert, know name only, feels well, no reports of pain. Follows commands. Seen by PT, GREG recommended       ICU Vital Signs Last 24 Hrs  T(C): 37 (21 Nov 2023 21:20), Max: 37.1 (21 Nov 2023 17:23)  T(F): 98.6 (21 Nov 2023 21:20), Max: 98.8 (21 Nov 2023 17:23)  HR: 88 (21 Nov 2023 21:20) (83 - 95)  BP: 128/71 (21 Nov 2023 21:20) (116/72 - 128/71)  BP(mean): --  ABP: --  ABP(mean): --  RR: 18 (21 Nov 2023 21:20) (18 - 18)  SpO2: 93% (21 Nov 2023 21:20) (93% - 98%)    O2 Parameters below as of 21 Nov 2023 21:20  Patient On (Oxygen Delivery Method): room air          PHYSICAL EXAM:  GENERAL: NAD, lying in bed comfortably  HEAD:  Atraumatic, Normocephalic  EYES: conjunctiva and sclera clear  ENT: Moist mucous membranes  NECK: Supple, No JVD  CHEST/LUNG: Clear to auscultation bilaterally; No rales, rhonchi, wheezing. Unlabored respirations  HEART: Regular rate and rhythm; No murmurs  ABDOMEN: Bowel sounds present; Soft, Nontender, Nondistended.   EXTREMITIES:  2+ Peripheral Pulses, brisk capillary refill. No clubbing, cyanosis, or edema  NERVOUS SYSTEM:  Alert & Oriented X1, speech clear.         Assessment and Plan:   · Assessment    Closed R intertrochanteric fracture s/p mechanical fall   -S/p IMN with Dr. Heller 11/19  -pain control with Tylenol and prn morphine  -on Lovenox 40 qd x 30 days  -repeat    Anemia - likely 2/2 blood loss post op  -hgb 8.8  - Dtr/hcp consented to prbc  -s/p 1 unit prbc      Hyperglycemia  -A1C in prediabetic range     HTN  -controlled on home dose toprol 50mg daily    Hypothyroidism  -clinically euthyroid cont home dose synthroid 50mcg daily    hx of dementia and depression, cont home dose Aricept and zoloft    GERD  -controlled on H2 blockers famotidine 40mg daily         PT eval noted: GREG recommended       DISPO: D/C planning for GREG   time spent: 59 min

## 2023-11-19 NOTE — DISCHARGE NOTE PROVIDER - PROVIDER TOKENS
PROVIDER:[TOKEN:[[id number]]] PROVIDER:[TOKEN:[[id number]]],FREE:[LAST:[osmany],FIRST:[roni],PHONE:[(   )    -],FAX:[(   )    -]]

## 2023-11-19 NOTE — BRIEF OPERATIVE NOTE - NSICDXBRIEFPOSTOP_GEN_ALL_CORE_FT
POST-OP DIAGNOSIS:  Closed intertrochanteric fracture of right hip 19-Nov-2023 09:42:40  Pablito Vo

## 2023-11-19 NOTE — DISCHARGE NOTE PROVIDER - NSDCFUADDINST_GEN_ALL_CORE_FT
Discharge Instructions for Intramedullary Nail of Right Hip:    1. ACTIVITY: Ambulate as tolerated with assistive devices as needed. Daily PT.  2. CALL FOR: fever over 101, wound redness, drainage or open area, calf pain/calf swelling.  3. BANDAGE: Change dressing to a new bandage postoperative day 7. May change sooner if dressing saturated or falling off. DO NOT REMOVE BANDAGE TO CHECK WOUND ON INTAKE.  4. STAPLES: RN Remove Staples postoperative day 14.  5. SHOWER: Okay to shower. Do not scrub dressing or submerge under water. No baths or hot tubs.   6. DVT PE Prophylaxis: Take as prescribed by your medical doctor.  7.  FOLLOW UP: Dr. Heller in 2 weeks. Call to schedule.  8. MEDICATION: eRX sent to your pharmacy for  if you go home.   9.**Call office if medications not covered under your insurance, especially BLOOD CLOT PREVENTION/anticoagulant medication.   IM Nail DC Instructions:    1. ACTIVITY: Weight Bearing as Tolerated with assistance and rolling walker  2. DVT:Continue DVT/PE Prophylaxis. See Med Rec for Duration and dose.  3. PT: daily  4. FOLLOW UP: Orthopedic Surgeon DR HELLER in 30 days. Call Office For Appointment.  5. WOUND CARE: STAPLES: Remove by RN POD14 (DEC3)    6. BANDAGE: MEPILEX:  IF SATURATED Change bandage on POD7 to dry gauze and tegaderm or paper tape. Do NOT remove on arrival to inspect wound.   7. For Dr. Heller, follow up in 1 month. And perform Portable Xray of Extremity at Rehab POD14 (DEC3) before follow up.

## 2023-11-19 NOTE — DISCHARGE NOTE PROVIDER - NSDCACTIVITY_GEN_ALL_CORE
Walking - Indoors allowed/Walking - Outdoors allowed/Follow Instructions Provided by your Surgical Team Do not drive or operate machinery/Walking - Indoors allowed/Walking - Outdoors allowed/Follow Instructions Provided by your Surgical Team Do not drive or operate machinery/Showering allowed/Stairs allowed/Walking - Indoors allowed/Walking - Outdoors allowed/Follow Instructions Provided by your Surgical Team

## 2023-11-20 LAB
ANION GAP SERPL CALC-SCNC: 8 MMOL/L — SIGNIFICANT CHANGE UP (ref 5–17)
ANION GAP SERPL CALC-SCNC: 8 MMOL/L — SIGNIFICANT CHANGE UP (ref 5–17)
BUN SERPL-MCNC: 27 MG/DL — HIGH (ref 7–23)
BUN SERPL-MCNC: 27 MG/DL — HIGH (ref 7–23)
CALCIUM SERPL-MCNC: 8.3 MG/DL — LOW (ref 8.5–10.1)
CALCIUM SERPL-MCNC: 8.3 MG/DL — LOW (ref 8.5–10.1)
CHLORIDE SERPL-SCNC: 109 MMOL/L — HIGH (ref 96–108)
CHLORIDE SERPL-SCNC: 109 MMOL/L — HIGH (ref 96–108)
CO2 SERPL-SCNC: 24 MMOL/L — SIGNIFICANT CHANGE UP (ref 22–31)
CO2 SERPL-SCNC: 24 MMOL/L — SIGNIFICANT CHANGE UP (ref 22–31)
CREAT SERPL-MCNC: 0.59 MG/DL — SIGNIFICANT CHANGE UP (ref 0.5–1.3)
CREAT SERPL-MCNC: 0.59 MG/DL — SIGNIFICANT CHANGE UP (ref 0.5–1.3)
EGFR: 93 ML/MIN/1.73M2 — SIGNIFICANT CHANGE UP
EGFR: 93 ML/MIN/1.73M2 — SIGNIFICANT CHANGE UP
GLUCOSE SERPL-MCNC: 128 MG/DL — HIGH (ref 70–99)
GLUCOSE SERPL-MCNC: 128 MG/DL — HIGH (ref 70–99)
HCT VFR BLD CALC: 27.6 % — LOW (ref 34.5–45)
HCT VFR BLD CALC: 27.6 % — LOW (ref 34.5–45)
HGB BLD-MCNC: 9.2 G/DL — LOW (ref 11.5–15.5)
HGB BLD-MCNC: 9.2 G/DL — LOW (ref 11.5–15.5)
MCHC RBC-ENTMCNC: 29.7 PG — SIGNIFICANT CHANGE UP (ref 27–34)
MCHC RBC-ENTMCNC: 29.7 PG — SIGNIFICANT CHANGE UP (ref 27–34)
MCHC RBC-ENTMCNC: 33.3 GM/DL — SIGNIFICANT CHANGE UP (ref 32–36)
MCHC RBC-ENTMCNC: 33.3 GM/DL — SIGNIFICANT CHANGE UP (ref 32–36)
MCV RBC AUTO: 89 FL — SIGNIFICANT CHANGE UP (ref 80–100)
MCV RBC AUTO: 89 FL — SIGNIFICANT CHANGE UP (ref 80–100)
PLATELET # BLD AUTO: 221 K/UL — SIGNIFICANT CHANGE UP (ref 150–400)
PLATELET # BLD AUTO: 221 K/UL — SIGNIFICANT CHANGE UP (ref 150–400)
POTASSIUM SERPL-MCNC: 4.1 MMOL/L — SIGNIFICANT CHANGE UP (ref 3.5–5.3)
POTASSIUM SERPL-MCNC: 4.1 MMOL/L — SIGNIFICANT CHANGE UP (ref 3.5–5.3)
POTASSIUM SERPL-SCNC: 4.1 MMOL/L — SIGNIFICANT CHANGE UP (ref 3.5–5.3)
POTASSIUM SERPL-SCNC: 4.1 MMOL/L — SIGNIFICANT CHANGE UP (ref 3.5–5.3)
RBC # BLD: 3.1 M/UL — LOW (ref 3.8–5.2)
RBC # BLD: 3.1 M/UL — LOW (ref 3.8–5.2)
RBC # FLD: 13.7 % — SIGNIFICANT CHANGE UP (ref 10.3–14.5)
RBC # FLD: 13.7 % — SIGNIFICANT CHANGE UP (ref 10.3–14.5)
SODIUM SERPL-SCNC: 141 MMOL/L — SIGNIFICANT CHANGE UP (ref 135–145)
SODIUM SERPL-SCNC: 141 MMOL/L — SIGNIFICANT CHANGE UP (ref 135–145)
WBC # BLD: 9.72 K/UL — SIGNIFICANT CHANGE UP (ref 3.8–10.5)
WBC # BLD: 9.72 K/UL — SIGNIFICANT CHANGE UP (ref 3.8–10.5)
WBC # FLD AUTO: 9.72 K/UL — SIGNIFICANT CHANGE UP (ref 3.8–10.5)
WBC # FLD AUTO: 9.72 K/UL — SIGNIFICANT CHANGE UP (ref 3.8–10.5)

## 2023-11-20 PROCEDURE — 99231 SBSQ HOSP IP/OBS SF/LOW 25: CPT

## 2023-11-20 PROCEDURE — 99222 1ST HOSP IP/OBS MODERATE 55: CPT

## 2023-11-20 RX ADMIN — Medication 50 MICROGRAM(S): at 05:12

## 2023-11-20 RX ADMIN — FAMOTIDINE 40 MILLIGRAM(S): 10 INJECTION INTRAVENOUS at 21:30

## 2023-11-20 RX ADMIN — Medication 3 MILLIGRAM(S): at 21:30

## 2023-11-20 RX ADMIN — Medication 650 MILLIGRAM(S): at 12:24

## 2023-11-20 RX ADMIN — DONEPEZIL HYDROCHLORIDE 10 MILLIGRAM(S): 10 TABLET, FILM COATED ORAL at 21:30

## 2023-11-20 RX ADMIN — Medication 650 MILLIGRAM(S): at 17:49

## 2023-11-20 RX ADMIN — SERTRALINE 100 MILLIGRAM(S): 25 TABLET, FILM COATED ORAL at 10:08

## 2023-11-20 RX ADMIN — Medication 650 MILLIGRAM(S): at 05:12

## 2023-11-20 RX ADMIN — Medication 50 MILLIGRAM(S): at 10:08

## 2023-11-20 RX ADMIN — ENOXAPARIN SODIUM 40 MILLIGRAM(S): 100 INJECTION SUBCUTANEOUS at 05:13

## 2023-11-20 RX ADMIN — Medication 2 TABLET(S): at 10:09

## 2023-11-20 RX ADMIN — PREGABALIN 500 MICROGRAM(S): 225 CAPSULE ORAL at 10:09

## 2023-11-20 RX ADMIN — Medication 1 MILLIGRAM(S): at 10:09

## 2023-11-20 RX ADMIN — SENNA PLUS 2 TABLET(S): 8.6 TABLET ORAL at 21:31

## 2023-11-20 RX ADMIN — POLYETHYLENE GLYCOL 3350 17 GRAM(S): 17 POWDER, FOR SOLUTION ORAL at 10:09

## 2023-11-20 NOTE — OCCUPATIONAL THERAPY INITIAL EVALUATION ADULT - PERTINENT HX OF CURRENT PROBLEM, REHAB EVAL
Per H&P- pt is a 79 y/o female w/ PMHx of HTN, dementia, osteoporosis, GERD presents to the ER from [facility] after a likely mechanical fall. As per documents witnessed fall out of bed however patient does not remember how it occurred. She states at home she usually does not need to use a cane but sometimes she has to. Pt found to have a closed intertrochanteric fracture of right hip and is now s/p R IMN.

## 2023-11-20 NOTE — PHYSICAL THERAPY INITIAL EVALUATION ADULT - ADDITIONAL COMMENTS
Pt is a poor historian 2* h/o dementia, PLOF/history obtained from EMR, pt resides at [facility], she uses a cane and a walker; daughter drives her to and from medical appointments.

## 2023-11-20 NOTE — CONSULT NOTE ADULT - SUBJECTIVE AND OBJECTIVE BOX
Patient is a 78y old  Female who presents with a chief complaint of fall    HPI:  This is a 78 year old female with significant past medical history of HTN, dementia, osteoporosis, GERD presents to the ER from [facility] after a likely mechanical fall. As per SNF documentation, witness fall out of bed. Patient poor historian due to dementia. Evaluated POD#1 s/p right hip IM nail. Per documentation, patient extubated post procedure with removal LMA and subsequently vomiting 75ml ?CGE. Per medical staff, no further episodes. Pateint luther lawrence in bed working with PT. Pleasant and cooperative. Denies any heartburn, epigastric pain, or abdominal pain. On famotidine daily for history GERD. No documentation of any blood thinning agents or chronic NSAID use. Hgb did drop 13 to 9 post-op and IVF.    PAST MEDICAL & SURGICAL HISTORY:  GERD (gastroesophageal reflux disease)    MEDICATIONS  (STANDING):  acetaminophen     Tablet .. 650 milliGRAM(s) Oral every 6 hours  calcium carbonate    500 mG (Tums) Chewable 2 Tablet(s) Chew daily  cyanocobalamin 500 MICROGram(s) Oral daily  donepezil 10 milliGRAM(s) Oral at bedtime  enoxaparin Injectable 40 milliGRAM(s) SubCutaneous every 24 hours  ergocalciferol 50861 Unit(s) Oral <User Schedule>  famotidine    Tablet 40 milliGRAM(s) Oral at bedtime  folic acid 1 milliGRAM(s) Oral daily  levothyroxine 50 MICROGram(s) Oral daily  metoprolol succinate ER 50 milliGRAM(s) Oral daily  naloxone Injectable 0.4 milliGRAM(s) IV Push once  polyethylene glycol 3350 17 Gram(s) Oral daily  senna 2 Tablet(s) Oral at bedtime  sertraline 100 milliGRAM(s) Oral daily    MEDICATIONS  (PRN):  aluminum hydroxide/magnesium hydroxide/simethicone Suspension 30 milliLiter(s) Oral every 4 hours PRN Dyspepsia  bisacodyl 5 milliGRAM(s) Oral daily PRN Constipation  magnesium hydroxide Suspension 30 milliLiter(s) Oral daily PRN Constipation  melatonin 3 milliGRAM(s) Oral at bedtime PRN Insomnia  ondansetron Injectable 4 milliGRAM(s) IV Push every 8 hours PRN Nausea and/or Vomiting  oxyCODONE    IR 5 milliGRAM(s) Oral every 6 hours PRN Severe Pain (7 - 10)  oxyCODONE    IR 2.5 milliGRAM(s) Oral every 6 hours PRN Moderate Pain (4 - 6)  traMADol 25 milliGRAM(s) Oral every 6 hours PRN Mild Pain (1 - 3)      Allergies    penicillins (Unknown)    Intolerances        SOCIAL HISTORY:    FAMILY HISTORY:      REVIEW OF SYSTEMS:    Unable to fully obtain due to baseline dementia.    Vital Signs Last 24 Hrs  T(C): 36.8 (20 Nov 2023 08:32), Max: 37.1 (19 Nov 2023 21:41)  T(F): 98.2 (20 Nov 2023 08:32), Max: 98.8 (19 Nov 2023 21:41)  HR: 80 (20 Nov 2023 08:32) (80 - 109)  BP: 99/69 (20 Nov 2023 08:32) (99/69 - 114/83)  BP(mean): --  RR: 18 (20 Nov 2023 08:32) (17 - 18)  SpO2: 94% (20 Nov 2023 08:32) (93% - 100%)    Parameters below as of 20 Nov 2023 08:32  Patient On (Oxygen Delivery Method): room air        PHYSICAL EXAM:    Constitutional: No acute distress, non-toxic appearing  HEENT: masked, good phonation, not icteric  Neck: supple, no lymphadenopathy  Respiratory: clear to ascultation bilaterally, no wheezing  Cardiovascular: S1 and S2, regular rate and rhythm, no murmurs rubs or gallops  Gastrointestinal: soft, non-tender, non-distended, +bowel sounds, no rebound or guarding, no surgical scars, no drains  Extremities: No peripheral edema, no cyanosis or clubbing  Vascular: 2+ peripheral pulses, no venous stasis  Neurological: A/O x 1-2, no focal deficits, no asterixis  Psychiatric: Normal mood, normal affect  Skin: No rashes, not jaundiced, drsg right lat hip cdi    LABS:                        9.2    9.72  )-----------( 221      ( 20 Nov 2023 06:43 )             27.6     11-20    141  |  109<H>  |  27<H>  ----------------------------<  128<H>  4.1   |  24  |  0.59    Ca    8.3<L>      20 Nov 2023 06:43    TPro  7.5  /  Alb  3.4  /  TBili  0.3  /  DBili  x   /  AST  11<L>  /  ALT  11<L>  /  AlkPhos  77  11-18    PT/INR - ( 19 Nov 2023 04:56 )   PT: 11.9 sec;   INR: 1.05 ratio         PTT - ( 19 Nov 2023 04:56 )  PTT:27.7 sec  LIVER FUNCTIONS - ( 18 Nov 2023 14:53 )  Alb: 3.4 g/dL / Pro: 7.5 gm/dL / ALK PHOS: 77 U/L / ALT: 11 U/L / AST: 11 U/L / GGT: x             RADIOLOGY & ADDITIONAL STUDIES: Patient is a 78y old  Female who presents with a chief complaint of fall    78 year old female with significant past medical history of HTN, dementia, osteoporosis, GERD presents to the ER from [facility] after a likely mechanical fall. As per SNF documentation, witness fall out of bed. Patient poor historian due to dementia. S/p orthopedic sucgery (hip nail) and consulted for post anesthetic vomiting.      Per documentation, patient extubated post procedure with removal LMA and subsequently vomiting 75ml ?CGE. Per medical staff, no further episodes. Patient sitting upright in bed working with PT. Pleasant and cooperative. Denies any heartburn, epigastric pain, or abdominal pain. On famotidine daily for history GERD. No documentation of any blood thinning agents or chronic NSAID use. Hgb did drop 13 to 9 post-op and IVF. No hemateochezia or melena. No hematemesis. No post prandial issues.     PAST MEDICAL & SURGICAL HISTORY:  GERD (gastroesophageal reflux disease)  HTN  dementia  osteoporosis    MEDICATIONS  (STANDING):  acetaminophen     Tablet .. 650 milliGRAM(s) Oral every 6 hours  calcium carbonate    500 mG (Tums) Chewable 2 Tablet(s) Chew daily  cyanocobalamin 500 MICROGram(s) Oral daily  donepezil 10 milliGRAM(s) Oral at bedtime  enoxaparin Injectable 40 milliGRAM(s) SubCutaneous every 24 hours  ergocalciferol 75528 Unit(s) Oral <User Schedule>  famotidine    Tablet 40 milliGRAM(s) Oral at bedtime  folic acid 1 milliGRAM(s) Oral daily  levothyroxine 50 MICROGram(s) Oral daily  metoprolol succinate ER 50 milliGRAM(s) Oral daily  naloxone Injectable 0.4 milliGRAM(s) IV Push once  polyethylene glycol 3350 17 Gram(s) Oral daily  senna 2 Tablet(s) Oral at bedtime  sertraline 100 milliGRAM(s) Oral daily    MEDICATIONS  (PRN):  aluminum hydroxide/magnesium hydroxide/simethicone Suspension 30 milliLiter(s) Oral every 4 hours PRN Dyspepsia  bisacodyl 5 milliGRAM(s) Oral daily PRN Constipation  magnesium hydroxide Suspension 30 milliLiter(s) Oral daily PRN Constipation  melatonin 3 milliGRAM(s) Oral at bedtime PRN Insomnia  ondansetron Injectable 4 milliGRAM(s) IV Push every 8 hours PRN Nausea and/or Vomiting  oxyCODONE    IR 5 milliGRAM(s) Oral every 6 hours PRN Severe Pain (7 - 10)  oxyCODONE    IR 2.5 milliGRAM(s) Oral every 6 hours PRN Moderate Pain (4 - 6)  traMADol 25 milliGRAM(s) Oral every 6 hours PRN Mild Pain (1 - 3)      Allergies    penicillins (Unknown)    Intolerances    SOCIAL HISTORY:  no smoking, drinking or drugs  lives in [facility]    FAMILY HISTORY:  no colon or stomach cancer    REVIEW OF SYSTEMS:    Unable to fully obtain due to baseline dementia.    Vital Signs Last 24 Hrs  T(C): 36.8 (20 Nov 2023 08:32), Max: 37.1 (19 Nov 2023 21:41)  T(F): 98.2 (20 Nov 2023 08:32), Max: 98.8 (19 Nov 2023 21:41)  HR: 80 (20 Nov 2023 08:32) (80 - 109)  BP: 99/69 (20 Nov 2023 08:32) (99/69 - 114/83)  BP(mean): --  RR: 18 (20 Nov 2023 08:32) (17 - 18)  SpO2: 94% (20 Nov 2023 08:32) (93% - 100%)    Parameters below as of 20 Nov 2023 08:32  Patient On (Oxygen Delivery Method): room air        PHYSICAL EXAM:    Constitutional: No acute distress, non-toxic appearing, pleasant, working with PT at bedside  HEENT: unmasked, good phonation, not icteric  Neck: supple, no lymphadenopathy  Respiratory: clear to ascultation bilaterally, no wheezing  Cardiovascular: S1 and S2, regular rate and rhythm, no murmurs rubs or gallops  Gastrointestinal: soft, non-tender, non-distended, +bowel sounds, no rebound or guarding  Extremities: No peripheral edema, no cyanosis or clubbing  Vascular: 2+ peripheral pulses, no venous stasis  Neurological: A/O x 1-2, no focal deficits, no asterixis  Psychiatric: Normal mood, normal affect  Skin: No rashes, not jaundiced, drsg right lat hip cdi    LABS:                        9.2    9.72  )-----------( 221      ( 20 Nov 2023 06:43 )             27.6     11-20    141  |  109<H>  |  27<H>  ----------------------------<  128<H>  4.1   |  24  |  0.59    Ca    8.3<L>      20 Nov 2023 06:43    TPro  7.5  /  Alb  3.4  /  TBili  0.3  /  DBili  x   /  AST  11<L>  /  ALT  11<L>  /  AlkPhos  77  11-18    PT/INR - ( 19 Nov 2023 04:56 )   PT: 11.9 sec;   INR: 1.05 ratio         PTT - ( 19 Nov 2023 04:56 )  PTT:27.7 sec  LIVER FUNCTIONS - ( 18 Nov 2023 14:53 )  Alb: 3.4 g/dL / Pro: 7.5 gm/dL / ALK PHOS: 77 U/L / ALT: 11 U/L / AST: 11 U/L / GGT: x

## 2023-11-20 NOTE — PHYSICAL THERAPY INITIAL EVALUATION ADULT - GENERAL OBSERVATIONS, REHAB EVAL
The Pt was received on 5S in bed supine, calm, cooperative, and pleasantly confused but willing to participate with PT, +purewick, +SCDs. Pt responded well to evaluation. Required MIN A for mobility tasks, able to perform bed to chair transfers with RW support. Assisted into bedside chair and adjusted for comfort, +alarm. The Pt was in NAD at end of tx.  Call bell, tray, and phone in place and within reach. NSG made aware.

## 2023-11-20 NOTE — PHYSICAL THERAPY INITIAL EVALUATION ADULT - PERTINENT HX OF CURRENT PROBLEM, REHAB EVAL
HPI: 79 yo F w/ PMHx HTN, dementia, osteoporosis, GERD presents to the ER from [facility] after a likely mechanical fall. As per documents witnessed fall out of bed however patient does not remember how it occurred. She states at home she usually does not need to use a cane but sometimes she has to. She currently states pain is well controlled, denies any other complaints now or at home including chest pain, sob, nausea/vomiting, dizziness, palpitations.    Now s/p R IMN, POD1

## 2023-11-20 NOTE — PROGRESS NOTE ADULT - ASSESSMENT
Closed R intertrochanteric fracture s/p mechanical fall   -S/p IMN with Dr. Heller 11/19  -pain control with Tylenol and prn morphine  -on Lovenox     HTN  -controlled on home dose toprol 50mg daily    Hypothyroidism  -clinically euthyroid cont home dose synthroid 50mcg daily    hx of dementia and depression, cont home dose Aricept and zoloft    GERD  -controlled on H2 blockers famotidine 40mg daily     Leukocytosis likely reactive in setting of hip fracture, no clinical s/s of infection will monitor       PT eval noted: GREG recommended       DISPO: D/C planning for GREG      Closed R intertrochanteric fracture s/p mechanical fall   -S/p IMN with Dr. Heller 11/19  -pain control with Tylenol and prn morphine  -on Lovenox     Anemia - likely 2/2 blood loss post op  -trend h/h    Hyperglycemia  -A1C in prediabetic range     HTN  -controlled on home dose toprol 50mg daily    Hypothyroidism  -clinically euthyroid cont home dose synthroid 50mcg daily    hx of dementia and depression, cont home dose Aricept and zoloft    GERD  -controlled on H2 blockers famotidine 40mg daily     Leukocytosis likely reactive in setting of hip fracture, no clinical s/s of infection will monitor       PT eval noted: GREG recommended       DISPO: D/C planning for GREG

## 2023-11-20 NOTE — OCCUPATIONAL THERAPY INITIAL EVALUATION ADULT - ADL RETRAINING, OT EVAL
Pt will perform LBD with moderate assist in 1-2 tx sessions. Pt will perform toileting with moderate assist in 1-2 tx sessions.

## 2023-11-20 NOTE — OCCUPATIONAL THERAPY INITIAL EVALUATION ADULT - ADDITIONAL COMMENTS
Pt is a poor historian 2* h/o dementia, PLOF/history obtained from EMR, pt resides at [facility], she uses a cane and a walker; daughter drives her to and from medical appointments. Unknown to this writer how much help needed for ADL's, longterm performed IADL tasks.

## 2023-11-20 NOTE — OCCUPATIONAL THERAPY INITIAL EVALUATION ADULT - MODALITIES TREATMENT COMMENTS
Pt left seated in bedside chair, chair alarmed, PCA and RN notified, CB/TT/phone IR, needs met, pt left in NAD.

## 2023-11-20 NOTE — OCCUPATIONAL THERAPY INITIAL EVALUATION ADULT - NSOTDISCHREC_GEN_A_CORE
GREG vs return to [facility] with in house OT/PT and supervision/assist for ADL/mobility tasks (pending progress)

## 2023-11-20 NOTE — CONSULT NOTE ADULT - ASSESSMENT
78 year old female with history dementia, GERD s/p fall at [facility] sustaining right hip fracture requiring right hip IM nail with ?CGE s/p extubation LMA post procedure.  Likely remnant stomach contents. Hgb did drop s/p procedure and IVF. Dilutional and surgical blood loss components likely etiology as not convinced of any active GIB    Would recommend PPI and Carafate QID  No emergent endoscopy recommended

## 2023-11-20 NOTE — OCCUPATIONAL THERAPY INITIAL EVALUATION ADULT - PRECAUTIONS/LIMITATIONS, REHAB EVAL
diet: DASH/TLC, elevate HOB, notify if systolic >160 <90, diastolic >100, HR >100 <60, 3L SpO2 notify if <94%/cardiac precautions/fall precautions/surgical precautions

## 2023-11-20 NOTE — OCCUPATIONAL THERAPY INITIAL EVALUATION ADULT - NS ASR FOLLOW COMMAND OT EVAL
75% of the time with multimodal cues for follow-through/75% of the time/able to follow single-step instructions

## 2023-11-20 NOTE — CONSULT NOTE ADULT - NS ATTEND AMEND GEN_ALL_CORE FT
78 year old woman with demntia with post operative vomiting after removal of LMA.     hgb drop from surgery, fluids. no overt bleeding  likely vomited stomach contents  patient fine now  no hematemesis or melena  H2 blocker as prescribed  supportive care

## 2023-11-21 LAB
24R-OH-CALCIDIOL SERPL-MCNC: 74.4 NG/ML — SIGNIFICANT CHANGE UP (ref 30–80)
24R-OH-CALCIDIOL SERPL-MCNC: 74.4 NG/ML — SIGNIFICANT CHANGE UP (ref 30–80)
ALBUMIN SERPL ELPH-MCNC: 2.9 G/DL — LOW (ref 3.3–5)
ALBUMIN SERPL ELPH-MCNC: 2.9 G/DL — LOW (ref 3.3–5)
ANION GAP SERPL CALC-SCNC: 4 MMOL/L — LOW (ref 5–17)
ANION GAP SERPL CALC-SCNC: 4 MMOL/L — LOW (ref 5–17)
BUN SERPL-MCNC: 21 MG/DL — SIGNIFICANT CHANGE UP (ref 7–23)
BUN SERPL-MCNC: 21 MG/DL — SIGNIFICANT CHANGE UP (ref 7–23)
CALCIUM SERPL-MCNC: 8.3 MG/DL — LOW (ref 8.5–10.1)
CALCIUM SERPL-MCNC: 8.3 MG/DL — LOW (ref 8.5–10.1)
CHLORIDE SERPL-SCNC: 107 MMOL/L — SIGNIFICANT CHANGE UP (ref 96–108)
CHLORIDE SERPL-SCNC: 107 MMOL/L — SIGNIFICANT CHANGE UP (ref 96–108)
CO2 SERPL-SCNC: 30 MMOL/L — SIGNIFICANT CHANGE UP (ref 22–31)
CO2 SERPL-SCNC: 30 MMOL/L — SIGNIFICANT CHANGE UP (ref 22–31)
CREAT SERPL-MCNC: 0.56 MG/DL — SIGNIFICANT CHANGE UP (ref 0.5–1.3)
CREAT SERPL-MCNC: 0.56 MG/DL — SIGNIFICANT CHANGE UP (ref 0.5–1.3)
EGFR: 94 ML/MIN/1.73M2 — SIGNIFICANT CHANGE UP
EGFR: 94 ML/MIN/1.73M2 — SIGNIFICANT CHANGE UP
GLUCOSE SERPL-MCNC: 141 MG/DL — HIGH (ref 70–99)
GLUCOSE SERPL-MCNC: 141 MG/DL — HIGH (ref 70–99)
HCT VFR BLD CALC: 24.4 % — LOW (ref 34.5–45)
HCT VFR BLD CALC: 24.4 % — LOW (ref 34.5–45)
HCT VFR BLD CALC: 25.2 % — LOW (ref 34.5–45)
HCT VFR BLD CALC: 25.2 % — LOW (ref 34.5–45)
HGB BLD-MCNC: 8.3 G/DL — LOW (ref 11.5–15.5)
HGB BLD-MCNC: 8.3 G/DL — LOW (ref 11.5–15.5)
HGB BLD-MCNC: 8.5 G/DL — LOW (ref 11.5–15.5)
HGB BLD-MCNC: 8.5 G/DL — LOW (ref 11.5–15.5)
MCHC RBC-ENTMCNC: 30.1 PG — SIGNIFICANT CHANGE UP (ref 27–34)
MCHC RBC-ENTMCNC: 30.1 PG — SIGNIFICANT CHANGE UP (ref 27–34)
MCHC RBC-ENTMCNC: 33.7 GM/DL — SIGNIFICANT CHANGE UP (ref 32–36)
MCHC RBC-ENTMCNC: 33.7 GM/DL — SIGNIFICANT CHANGE UP (ref 32–36)
MCV RBC AUTO: 89.4 FL — SIGNIFICANT CHANGE UP (ref 80–100)
MCV RBC AUTO: 89.4 FL — SIGNIFICANT CHANGE UP (ref 80–100)
PLATELET # BLD AUTO: 236 K/UL — SIGNIFICANT CHANGE UP (ref 150–400)
PLATELET # BLD AUTO: 236 K/UL — SIGNIFICANT CHANGE UP (ref 150–400)
POTASSIUM SERPL-MCNC: 4.3 MMOL/L — SIGNIFICANT CHANGE UP (ref 3.5–5.3)
POTASSIUM SERPL-MCNC: 4.3 MMOL/L — SIGNIFICANT CHANGE UP (ref 3.5–5.3)
POTASSIUM SERPL-SCNC: 4.3 MMOL/L — SIGNIFICANT CHANGE UP (ref 3.5–5.3)
POTASSIUM SERPL-SCNC: 4.3 MMOL/L — SIGNIFICANT CHANGE UP (ref 3.5–5.3)
RBC # BLD: 2.82 M/UL — LOW (ref 3.8–5.2)
RBC # BLD: 2.82 M/UL — LOW (ref 3.8–5.2)
RBC # FLD: 13.6 % — SIGNIFICANT CHANGE UP (ref 10.3–14.5)
RBC # FLD: 13.6 % — SIGNIFICANT CHANGE UP (ref 10.3–14.5)
SODIUM SERPL-SCNC: 141 MMOL/L — SIGNIFICANT CHANGE UP (ref 135–145)
SODIUM SERPL-SCNC: 141 MMOL/L — SIGNIFICANT CHANGE UP (ref 135–145)
WBC # BLD: 10.44 K/UL — SIGNIFICANT CHANGE UP (ref 3.8–10.5)
WBC # BLD: 10.44 K/UL — SIGNIFICANT CHANGE UP (ref 3.8–10.5)
WBC # FLD AUTO: 10.44 K/UL — SIGNIFICANT CHANGE UP (ref 3.8–10.5)
WBC # FLD AUTO: 10.44 K/UL — SIGNIFICANT CHANGE UP (ref 3.8–10.5)

## 2023-11-21 PROCEDURE — 99233 SBSQ HOSP IP/OBS HIGH 50: CPT

## 2023-11-21 RX ORDER — ENOXAPARIN SODIUM 100 MG/ML
40 INJECTION SUBCUTANEOUS
Qty: 0 | Refills: 0 | DISCHARGE

## 2023-11-21 RX ORDER — POLYETHYLENE GLYCOL 3350 17 G/17G
17 POWDER, FOR SOLUTION ORAL
Qty: 0 | Refills: 0 | DISCHARGE
Start: 2023-11-21

## 2023-11-21 RX ORDER — SODIUM CHLORIDE 9 MG/ML
1000 INJECTION INTRAMUSCULAR; INTRAVENOUS; SUBCUTANEOUS
Refills: 0 | Status: DISCONTINUED | OUTPATIENT
Start: 2023-11-21 | End: 2023-11-22

## 2023-11-21 RX ORDER — SUCRALFATE 1 G
1 TABLET ORAL
Qty: 0 | Refills: 0 | DISCHARGE

## 2023-11-21 RX ORDER — PANTOPRAZOLE SODIUM 20 MG/1
1 TABLET, DELAYED RELEASE ORAL
Qty: 0 | Refills: 0 | DISCHARGE

## 2023-11-21 RX ORDER — SENNA PLUS 8.6 MG/1
2 TABLET ORAL
Qty: 0 | Refills: 0 | DISCHARGE
Start: 2023-11-21

## 2023-11-21 RX ORDER — TRAMADOL HYDROCHLORIDE 50 MG/1
0.5 TABLET ORAL
Qty: 0 | Refills: 0 | DISCHARGE
Start: 2023-11-21

## 2023-11-21 RX ADMIN — Medication 650 MILLIGRAM(S): at 14:09

## 2023-11-21 RX ADMIN — Medication 1 MILLIGRAM(S): at 11:22

## 2023-11-21 RX ADMIN — Medication 650 MILLIGRAM(S): at 06:00

## 2023-11-21 RX ADMIN — SENNA PLUS 2 TABLET(S): 8.6 TABLET ORAL at 21:39

## 2023-11-21 RX ADMIN — Medication 50 MICROGRAM(S): at 05:11

## 2023-11-21 RX ADMIN — OXYCODONE HYDROCHLORIDE 5 MILLIGRAM(S): 5 TABLET ORAL at 04:07

## 2023-11-21 RX ADMIN — SERTRALINE 100 MILLIGRAM(S): 25 TABLET, FILM COATED ORAL at 11:22

## 2023-11-21 RX ADMIN — DONEPEZIL HYDROCHLORIDE 10 MILLIGRAM(S): 10 TABLET, FILM COATED ORAL at 21:40

## 2023-11-21 RX ADMIN — Medication 650 MILLIGRAM(S): at 18:14

## 2023-11-21 RX ADMIN — OXYCODONE HYDROCHLORIDE 5 MILLIGRAM(S): 5 TABLET ORAL at 05:00

## 2023-11-21 RX ADMIN — Medication 2 TABLET(S): at 11:23

## 2023-11-21 RX ADMIN — Medication 650 MILLIGRAM(S): at 14:40

## 2023-11-21 RX ADMIN — POLYETHYLENE GLYCOL 3350 17 GRAM(S): 17 POWDER, FOR SOLUTION ORAL at 11:24

## 2023-11-21 RX ADMIN — PREGABALIN 500 MICROGRAM(S): 225 CAPSULE ORAL at 11:23

## 2023-11-21 RX ADMIN — SODIUM CHLORIDE 50 MILLILITER(S): 9 INJECTION INTRAMUSCULAR; INTRAVENOUS; SUBCUTANEOUS at 21:49

## 2023-11-21 RX ADMIN — ONDANSETRON 4 MILLIGRAM(S): 8 TABLET, FILM COATED ORAL at 21:51

## 2023-11-21 RX ADMIN — Medication 3 MILLIGRAM(S): at 21:40

## 2023-11-21 RX ADMIN — Medication 650 MILLIGRAM(S): at 05:11

## 2023-11-21 RX ADMIN — ENOXAPARIN SODIUM 40 MILLIGRAM(S): 100 INJECTION SUBCUTANEOUS at 05:10

## 2023-11-21 RX ADMIN — OXYCODONE HYDROCHLORIDE 5 MILLIGRAM(S): 5 TABLET ORAL at 11:25

## 2023-11-21 RX ADMIN — Medication 50 MILLIGRAM(S): at 11:22

## 2023-11-21 RX ADMIN — FAMOTIDINE 40 MILLIGRAM(S): 10 INJECTION INTRAVENOUS at 21:39

## 2023-11-21 NOTE — DIETITIAN INITIAL EVALUATION ADULT - PERTINENT LABORATORY DATA
11-21    141  |  107  |  21  ----------------------------<  141<H>  4.3   |  30  |  0.56    Ca    8.3<L>      21 Nov 2023 06:12    TPro  x   /  Alb  2.9<L>  /  TBili  x   /  DBili  x   /  AST  x   /  ALT  x   /  AlkPhos  x   11-21  A1C with Estimated Average Glucose Result: 5.8 % (11-19-23 @ 10:03)

## 2023-11-21 NOTE — DIETITIAN INITIAL EVALUATION ADULT - ORAL INTAKE PTA/DIET HISTORY
Unable to obtain intake/ diet hx pta 2/2 pt's mental status. Pt from [facility] - no diet hx in shadow chart.

## 2023-11-21 NOTE — DIETITIAN NUTRITION RISK NOTIFICATION - ADDITIONAL COMMENTS/DIETITIAN RECOMMENDATIONS
1) Liberalize diet to regular to maximize caloric and nutrient intake.  2) Magic cup BID (provides 290 kcal/ 9 g protein) to optimize nutritional needs   3) Obtain vitamin D 25OH level to assess nutriture  4) Please obtain weekly weights  5) Consider adding thiamine 100 mg daily 2/2 poor PO intake/ malnutrition  6) MVI w/ minerals daily to ensure 100% RDA met  7) Encourage protein-rich foods, maximize food preferences  8) Monitor bowel movements, if no BM for >3 days, consider implementing bowel regimen.  9) Consider adding appetite stimulant such as Remeron or Marinol 2/2 chronically poor appetite/ PO intake  10) Confirm goals of care regarding nutrition support - Nutrition support is not recommended due to overall declining medical status which evidenced based studies indicate EN is not effective in prolonging survival and improving quality of life. It can also increase risk of aspiration pneumonia as well as other related issues (infection, GI complications, and worsening/ non-healing PI's). However, will provide nutrition/ hydration within GOC.  RD will continue to monitor PO intake, labs, hydration, and wt prn.

## 2023-11-21 NOTE — DIETITIAN INITIAL EVALUATION ADULT - NSFNSGIIOFT_GEN_A_CORE
I&O's Detail    20 Nov 2023 07:01  -  21 Nov 2023 07:00  --------------------------------------------------------  IN:  Total IN: 0 mL    OUT:    Incontinent per Collection Bag (mL): 550 mL  Total OUT: 550 mL    Total NET: -550 mL

## 2023-11-21 NOTE — DIETITIAN INITIAL EVALUATION ADULT - PERTINENT MEDS FT
MEDICATIONS  (STANDING):  acetaminophen     Tablet .. 650 milliGRAM(s) Oral every 6 hours  calcium carbonate    500 mG (Tums) Chewable 2 Tablet(s) Chew daily  cyanocobalamin 500 MICROGram(s) Oral daily  donepezil 10 milliGRAM(s) Oral at bedtime  enoxaparin Injectable 40 milliGRAM(s) SubCutaneous every 24 hours  ergocalciferol 57238 Unit(s) Oral <User Schedule>  famotidine    Tablet 40 milliGRAM(s) Oral at bedtime  folic acid 1 milliGRAM(s) Oral daily  levothyroxine 50 MICROGram(s) Oral daily  metoprolol succinate ER 50 milliGRAM(s) Oral daily  naloxone Injectable 0.4 milliGRAM(s) IV Push once  polyethylene glycol 3350 17 Gram(s) Oral daily  senna 2 Tablet(s) Oral at bedtime  sertraline 100 milliGRAM(s) Oral daily    MEDICATIONS  (PRN):  aluminum hydroxide/magnesium hydroxide/simethicone Suspension 30 milliLiter(s) Oral every 4 hours PRN Dyspepsia  bisacodyl 5 milliGRAM(s) Oral daily PRN Constipation  magnesium hydroxide Suspension 30 milliLiter(s) Oral daily PRN Constipation  melatonin 3 milliGRAM(s) Oral at bedtime PRN Insomnia  ondansetron Injectable 4 milliGRAM(s) IV Push every 8 hours PRN Nausea and/or Vomiting  oxyCODONE    IR 5 milliGRAM(s) Oral every 6 hours PRN Severe Pain (7 - 10)  oxyCODONE    IR 2.5 milliGRAM(s) Oral every 6 hours PRN Moderate Pain (4 - 6)  traMADol 25 milliGRAM(s) Oral every 6 hours PRN Mild Pain (1 - 3)    Home Medications:  Calcitrate 950 mg (200 mg elemental calcium) oral tablet: 2 tab(s) orally once a day (18 Nov 2023 20:01)  cyanocobalamin 500 mcg oral tablet: 1 tab(s) orally once a day (18 Nov 2023 20:00)  donepezil 10 mg oral tablet: 1 tab(s) orally once a day (18 Nov 2023 20:00)  Drisdol 1.25 mg (50,000 intl units) oral capsule: 1 cap(s) orally once a week (18 Nov 2023 22:36)  famotidine 40 mg oral tablet: 1 tab(s) orally once a day (18 Nov 2023 20:00)  folic acid 0.8 mg oral tablet: 1 tab(s) orally once a day (18 Nov 2023 20:00)  levothyroxine 50 mcg (0.05 mg) oral tablet: 1 tab(s) orally once a day (18 Nov 2023 20:00)  metoprolol succinate 50 mg oral tablet, extended release: 1 tab(s) orally once a day (18 Nov 2023 20:00)  Tylenol 500 mg oral tablet: 1 tab(s) orally once a day (18 Nov 2023 20:03)  Zoloft 100 mg oral tablet: 1 tab(s) orally once a day (18 Nov 2023 22:35)

## 2023-11-21 NOTE — DIETITIAN INITIAL EVALUATION ADULT - NS FNS DIET ORDER
Diet, Regular:   DASH/TLC {Sodium & Cholesterol Restricted} (DASH) (11-19-23 @ 08:31) [Available for Activation]  Diet, Clear Liquid (11-19-23 @ 08:31) [Available for Activation]  Diet, DASH/TLC:   Sodium & Cholesterol Restricted (11-18-23 @ 22:19) [Active]

## 2023-11-21 NOTE — PROGRESS NOTE ADULT - ASSESSMENT
Closed R intertrochanteric fracture s/p mechanical fall   -S/p IMN with Dr. Heller 11/19  -pain control with Tylenol and prn morphine  -on Lovenox     Anemia - likely 2/2 blood loss post op  -trend h/h  -hgb 8.3  - Dtr/hcp consented to prbc  -1 unit ordered      Hyperglycemia  -A1C in prediabetic range     HTN  -controlled on home dose toprol 50mg daily    Hypothyroidism  -clinically euthyroid cont home dose synthroid 50mcg daily    hx of dementia and depression, cont home dose Aricept and zoloft    GERD  -controlled on H2 blockers famotidine 40mg daily     Leukocytosis likely reactive in setting of hip fracture, no clinical s/s of infection will monitor       PT eval noted: GREG recommended       DISPO: D/C planning for GREG in am

## 2023-11-21 NOTE — DIETITIAN INITIAL EVALUATION ADULT - NSFNSGIASSESSMENTFT_GEN_A_CORE
(11/20) Constipation, fecal incontinence; pt receive senna and polyethylene glycol daily and bisacodyl and magnesium hydroxide PRN.

## 2023-11-21 NOTE — DIETITIAN INITIAL EVALUATION ADULT - OTHER INFO
77 y/o F with a PMHx HTN, dementia, osteoporosis, GERD presented to the ER from [facility] after a likely mechanical fall. As per documents witnessed fall out of bed however patient does not remember how it occurred. She states at home she usually does not need to use a cane but sometimes she has to. She currently states pain is well controlled. Admitted for R hip fx. Pt is DNR/DNI, however TF IS NOT ADDRESSED IN [facility]. S/p R IMN (11/19). Episode of coffee ground emesis after being extubated post-op.    Unable to obtain meaningful information 2/2 pt's mental status. Breakfast tray observed at bedside untouched. RD offered to help set pt up and help eat her breakfast, however pt states that she "is not hungry as she already ate breakfast at home this morning." RD obtained bedscale wt on 11/21 - 100#; 2+ edema may be skewing weight. No weight hx to review. Pt very thin, frail, and adriana appearing. NFPE reveals moderate to severe muscle/ fat wasting, pt meets criteria for PCM at this time. Recommend to liberalize diet to regular to maximize caloric and nutrient intake. HgbA1c level 5.8% indicating good glycemic control for age pta. Encouraged high kcal/ high protein intake, pt declines Ensure as she does not like the taste however is receptive to trialing Magic Cup BID in effort to optimize PO intake. Consider adding appetite stimulant such as Remeron or Marinol 2/2 chronically poor appetite/ PO intake. Nutrition support is not recommended due to overall declining medical status which evidenced based studies indicate EN is not effective in prolonging survival and improving quality of life. It can also increase risk of aspiration pneumonia as well as other related issues (infection, GI complications, and worsening/ non-healing PI's). However, will provide nutrition/ hydration within GOC. Please see additional recommendations below.

## 2023-11-22 ENCOUNTER — TRANSCRIPTION ENCOUNTER (OUTPATIENT)
Age: 78
End: 2023-11-22

## 2023-11-22 VITALS
SYSTOLIC BLOOD PRESSURE: 92 MMHG | HEART RATE: 77 BPM | DIASTOLIC BLOOD PRESSURE: 57 MMHG | OXYGEN SATURATION: 94 % | RESPIRATION RATE: 18 BRPM | TEMPERATURE: 98 F

## 2023-11-22 LAB
ANION GAP SERPL CALC-SCNC: 2 MMOL/L — LOW (ref 5–17)
ANION GAP SERPL CALC-SCNC: 2 MMOL/L — LOW (ref 5–17)
BUN SERPL-MCNC: 16 MG/DL — SIGNIFICANT CHANGE UP (ref 7–23)
BUN SERPL-MCNC: 16 MG/DL — SIGNIFICANT CHANGE UP (ref 7–23)
CALCIUM SERPL-MCNC: 8.1 MG/DL — LOW (ref 8.5–10.1)
CALCIUM SERPL-MCNC: 8.1 MG/DL — LOW (ref 8.5–10.1)
CHLORIDE SERPL-SCNC: 108 MMOL/L — SIGNIFICANT CHANGE UP (ref 96–108)
CHLORIDE SERPL-SCNC: 108 MMOL/L — SIGNIFICANT CHANGE UP (ref 96–108)
CO2 SERPL-SCNC: 29 MMOL/L — SIGNIFICANT CHANGE UP (ref 22–31)
CO2 SERPL-SCNC: 29 MMOL/L — SIGNIFICANT CHANGE UP (ref 22–31)
CREAT SERPL-MCNC: 0.5 MG/DL — SIGNIFICANT CHANGE UP (ref 0.5–1.3)
CREAT SERPL-MCNC: 0.5 MG/DL — SIGNIFICANT CHANGE UP (ref 0.5–1.3)
EGFR: 97 ML/MIN/1.73M2 — SIGNIFICANT CHANGE UP
EGFR: 97 ML/MIN/1.73M2 — SIGNIFICANT CHANGE UP
GLUCOSE SERPL-MCNC: 111 MG/DL — HIGH (ref 70–99)
GLUCOSE SERPL-MCNC: 111 MG/DL — HIGH (ref 70–99)
HCT VFR BLD CALC: 26.2 % — LOW (ref 34.5–45)
HCT VFR BLD CALC: 26.2 % — LOW (ref 34.5–45)
HGB BLD-MCNC: 8.8 G/DL — LOW (ref 11.5–15.5)
HGB BLD-MCNC: 8.8 G/DL — LOW (ref 11.5–15.5)
MCHC RBC-ENTMCNC: 29.9 PG — SIGNIFICANT CHANGE UP (ref 27–34)
MCHC RBC-ENTMCNC: 29.9 PG — SIGNIFICANT CHANGE UP (ref 27–34)
MCHC RBC-ENTMCNC: 33.6 GM/DL — SIGNIFICANT CHANGE UP (ref 32–36)
MCHC RBC-ENTMCNC: 33.6 GM/DL — SIGNIFICANT CHANGE UP (ref 32–36)
MCV RBC AUTO: 89.1 FL — SIGNIFICANT CHANGE UP (ref 80–100)
MCV RBC AUTO: 89.1 FL — SIGNIFICANT CHANGE UP (ref 80–100)
PLATELET # BLD AUTO: 237 K/UL — SIGNIFICANT CHANGE UP (ref 150–400)
PLATELET # BLD AUTO: 237 K/UL — SIGNIFICANT CHANGE UP (ref 150–400)
POTASSIUM SERPL-MCNC: 3.9 MMOL/L — SIGNIFICANT CHANGE UP (ref 3.5–5.3)
POTASSIUM SERPL-MCNC: 3.9 MMOL/L — SIGNIFICANT CHANGE UP (ref 3.5–5.3)
POTASSIUM SERPL-SCNC: 3.9 MMOL/L — SIGNIFICANT CHANGE UP (ref 3.5–5.3)
POTASSIUM SERPL-SCNC: 3.9 MMOL/L — SIGNIFICANT CHANGE UP (ref 3.5–5.3)
RBC # BLD: 2.94 M/UL — LOW (ref 3.8–5.2)
RBC # BLD: 2.94 M/UL — LOW (ref 3.8–5.2)
RBC # FLD: 14.4 % — SIGNIFICANT CHANGE UP (ref 10.3–14.5)
RBC # FLD: 14.4 % — SIGNIFICANT CHANGE UP (ref 10.3–14.5)
SARS-COV-2 RNA SPEC QL NAA+PROBE: SIGNIFICANT CHANGE UP
SARS-COV-2 RNA SPEC QL NAA+PROBE: SIGNIFICANT CHANGE UP
SODIUM SERPL-SCNC: 139 MMOL/L — SIGNIFICANT CHANGE UP (ref 135–145)
SODIUM SERPL-SCNC: 139 MMOL/L — SIGNIFICANT CHANGE UP (ref 135–145)
WBC # BLD: 11.97 K/UL — HIGH (ref 3.8–10.5)
WBC # BLD: 11.97 K/UL — HIGH (ref 3.8–10.5)
WBC # FLD AUTO: 11.97 K/UL — HIGH (ref 3.8–10.5)
WBC # FLD AUTO: 11.97 K/UL — HIGH (ref 3.8–10.5)

## 2023-11-22 PROCEDURE — 99239 HOSP IP/OBS DSCHRG MGMT >30: CPT

## 2023-11-22 RX ADMIN — POLYETHYLENE GLYCOL 3350 17 GRAM(S): 17 POWDER, FOR SOLUTION ORAL at 09:14

## 2023-11-22 RX ADMIN — Medication 650 MILLIGRAM(S): at 05:14

## 2023-11-22 RX ADMIN — Medication 1 MILLIGRAM(S): at 09:21

## 2023-11-22 RX ADMIN — Medication 650 MILLIGRAM(S): at 11:51

## 2023-11-22 RX ADMIN — Medication 50 MILLIGRAM(S): at 09:13

## 2023-11-22 RX ADMIN — SERTRALINE 100 MILLIGRAM(S): 25 TABLET, FILM COATED ORAL at 09:14

## 2023-11-22 RX ADMIN — Medication 2 TABLET(S): at 09:15

## 2023-11-22 RX ADMIN — PREGABALIN 500 MICROGRAM(S): 225 CAPSULE ORAL at 09:14

## 2023-11-22 RX ADMIN — ENOXAPARIN SODIUM 40 MILLIGRAM(S): 100 INJECTION SUBCUTANEOUS at 05:14

## 2023-11-22 RX ADMIN — Medication 50 MICROGRAM(S): at 05:15

## 2023-11-22 RX ADMIN — Medication 650 MILLIGRAM(S): at 00:17

## 2023-11-22 NOTE — PROGRESS NOTE ADULT - PROVIDER SPECIALTY LIST ADULT
Hospitalist
Orthopedics
Orthopedics
Hospitalist
Orthopedics
Hospitalist

## 2023-11-22 NOTE — PROGRESS NOTE ADULT - NUTRITIONAL ASSESSMENT
This patient has been assessed with a concern for Malnutrition and has been determined to have a diagnosis/diagnoses of Severe protein-calorie malnutrition and Underweight (BMI < 19).    This patient is being managed with:   Diet DASH/TLC-  Sodium & Cholesterol Restricted  Entered: Nov 18 2023 10:17PM  

## 2023-11-22 NOTE — PROGRESS NOTE ADULT - SUBJECTIVE AND OBJECTIVE BOX
Patient seen and examined at bedside Pt is demented and unable to fully participate in history but able to express that she has no acute complaints at this time. Pain well controlled.      Vital Signs (24 Hrs):  T(C): 37.1 (11-19-23 @ 21:41), Max: 37.1 (11-19-23 @ 21:41)  HR: 109 (11-19-23 @ 21:41) (92 - 109)  BP: 108/78 (11-19-23 @ 21:41) (108/78 - 125/85)  RR: 18 (11-19-23 @ 21:41) (12 - 18)  SpO2: 93% (11-19-23 @ 21:41) (93% - 100%)  Wt(kg): --    LABS:                          9.2    9.72  )-----------( 221      ( 20 Nov 2023 06:43 )             27.6     11-20    141  |  109<H>  |  27<H>  ----------------------------<  128<H>  4.1   |  24  |  0.59    Ca    8.3<L>      20 Nov 2023 06:43    TPro  7.5  /  Alb  3.4  /  TBili  0.3  /  DBili  x   /  AST  11<L>  /  ALT  11<L>  /  AlkPhos  77  11-18    LIVER FUNCTIONS - ( 18 Nov 2023 14:53 )  Alb: 3.4 g/dL / Pro: 7.5 gm/dL / ALK PHOS: 77 U/L / ALT: 11 U/L / AST: 11 U/L / GGT: x             Exam:  General: NAD, resting comfortably in bed    RLE:   Dressing in place C/D/I  SCD in place bilaterally  No calf tenderness   Motor: + EHL/FHL/TA/GSC  +SILT: SPN/DPN/Sandip/Saph/Tib  DP/PT 2+      A/P:  78y f s/p R IMN POD #1    -FU GI consult regarding coffee ground emesis  -PT/OT  -WBAT  -Pain Control  -DVT ppx: per primary team, currently ordered for Lovenox   -Continue perioperative abx x 24 hours  -FU AM Labs  -Rest, ice, compress and elevate the extremity as we needed  -Incentive Spirometry  -Medical management appreciated  -Dispo pending PT eval  -Discussed above with Dr. Heller, who agrees with plan
Postop Check s/p R IMN    Patient had a small amount of coffee ground emesis shortly after emergence from aesthesia otherwise she tolerated the procedure well. Patient seen and examined at bedside postoperatively. Pt is demented and unable to fully participate in history but able to express that she has no acute complaints at this time. Pain well controlled.       Vital Signs Last 24 Hrs  T(C): 36.7 (11-19-23 @ 17:12), Max: 37.1 (11-18-23 @ 20:50)  T(F): 98.1 (11-19-23 @ 17:12), Max: 98.8 (11-18-23 @ 20:50)  HR: 104 (11-19-23 @ 17:12) (88 - 104)  BP: 114/83 (11-19-23 @ 17:12) (110/81 - 125/85)  BP(mean): 82 (11-18-23 @ 20:21) (82 - 82)  RR: 17 (11-19-23 @ 17:12) (12 - 17)  SpO2: 100% (11-19-23 @ 17:12) (93% - 100%)      Exam:  General: NAD, resting comfortably in bed    RLE:   Dressing in place C/D/I  SCD in place bilaterally  No calf tenderness   Motor: + EHL/FHL/TA/GSC  +SILT: SPN/DPN/Sandip/Saph/Tib  DP/PT 2+      A/P:  78y f s/p R IMN POD #0    -FU GI consult regarding coffee ground emesis  -PT/OT  -WBAT  -Pain Control  -DVT ppx: per primary team, currently ordered for Lovenox   -Continue perioperative abx x 24 hours  -FU AM Labs  -Rest, ice, compress and elevate the extremity as we needed  -Incentive Spirometry  -Medical management appreciated  -Dispo pending PT eval  -Discussed above with Dr. Heller, who agrees with plan
Patient seen and examined at bedside Pt is demented and unable to fully participate in history but able to express that she has no acute complaints at this time. Pain well controlled.    Vital Signs (24 Hrs):  T(C): 36.8 (11-20-23 @ 20:03), Max: 37 (11-20-23 @ 16:03)  HR: 92 (11-20-23 @ 20:03) (80 - 93)  BP: 112/72 (11-20-23 @ 20:03) (99/69 - 116/74)  RR: 18 (11-20-23 @ 20:03) (18 - 18)  SpO2: 94% (11-20-23 @ 20:03) (94% - 94%)  Wt(kg): --    LABS:                          9.2    9.72  )-----------( 221      ( 20 Nov 2023 06:43 )             27.6     11-20    141  |  109<H>  |  27<H>  ----------------------------<  128<H>  4.1   |  24  |  0.59    Ca    8.3<L>      20 Nov 2023 06:43    Exam:  General: NAD, resting comfortably in bed    RLE:   Dressing in place C/D/I  SCD in place bilaterally  No calf tenderness   Motor: + EHL/FHL/TA/GSC  +SILT: SPN/DPN/Sandip/Saph/Tib  DP/PT 2+      A/P:  78y f s/p R IMN POD #2    -PT/OT daily  -WBAT  -Pain Control  -DVT ppx: per primary team, currently ordered for Lovenox   -FU AM Labs  -Rest, ice, compress and elevate the extremity as we needed  -Incentive Spirometry  -Medical management appreciated  -Dispo pending PT eval  -Discussed above with Dr. Heller, who agrees with plan
Patient seen and examined at bedside Pt is demented and unable to fully participate in history but able to express that she has no acute complaints at this time. Pain well controlled.    Vital Signs (24 Hrs):  T(C): 37 (11-21-23 @ 21:20), Max: 37.1 (11-21-23 @ 17:23)  HR: 88 (11-21-23 @ 21:20) (83 - 95)  BP: 128/71 (11-21-23 @ 21:20) (116/72 - 128/71)  RR: 18 (11-21-23 @ 21:20) (18 - 18)  SpO2: 93% (11-21-23 @ 21:20) (93% - 98%)  Wt(kg): --    LABS:                          8.3    x     )-----------( x        ( 21 Nov 2023 12:46 )             24.4     11-21    141  |  107  |  21  ----------------------------<  141<H>  4.3   |  30  |  0.56    Ca    8.3<L>      21 Nov 2023 06:12    TPro  x   /  Alb  2.9<L>  /  TBili  x   /  DBili  x   /  AST  x   /  ALT  x   /  AlkPhos  x   11-21    LIVER FUNCTIONS - ( 21 Nov 2023 06:12 )  Alb: 2.9 g/dL / Pro: x     / ALK PHOS: x     / ALT: x     / AST: x     / GGT: x                 General: NAD, resting comfortably in bed  RLE:   Dressing in place C/D/I  SCD in place bilaterally  No calf tenderness   Motor: + EHL/FHL/TA/GSC  +SILT: SPN/DPN/Sandip/Saph/Tib  DP/PT 2+      A/P:  78y f s/p R IMN POD #3    -PT/OT daily  -WBAT  -Pain Control  -DVT ppx: per primary team, currently ordered for Lovenox   -FU AM Labs  -Rest, ice, compress and elevate the extremity as we needed  -Incentive Spirometry  -Medical management appreciated  -Dispo: GREG  -Discussed above with Dr. Heller, who agrees with plan
Patient seen and examined at bedside this AM.  No acute complaints at this time. Pain well controlled. Denies chest pain, shortness of breath, nausea or vomiting. Limited ROS 2/2 dementia    PE:  Vital Signs Last 24 Hrs  T(C): 37.1 (11-18-23 @ 20:50), Max: 37.1 (11-18-23 @ 20:50)  T(F): 98.8 (11-18-23 @ 20:50), Max: 98.8 (11-18-23 @ 20:50)  HR: 88 (11-18-23 @ 20:50) (77 - 88)  BP: 119/79 (11-18-23 @ 20:50) (111/82 - 143/87)  BP(mean): 82 (11-18-23 @ 20:21) (82 - 89)  RR: 17 (11-18-23 @ 20:50) (16 - 18)  SpO2: 93% (11-18-23 @ 20:50) (93% - 93%)    General: NAD, resting comfortably in bed  RLE:   Shorten / ER  Compartments soft and compressible  No calf tenderness bilaterally  +TA/EHL/FHL/GSC  SILT L3-S1  + DP/PT                            12.3   5.90  )-----------( 291      ( 19 Nov 2023 04:56 )             36.8     11-19    139  |  107  |  28<H>  ----------------------------<  137<H>  3.8   |  27  |  0.76    Ca    8.5      19 Nov 2023 04:56    TPro  7.5  /  Alb  3.4  /  TBili  0.3  /  DBili  x   /  AST  11<L>  /  ALT  11<L>  /  AlkPhos  77  11-18    PT/INR - ( 19 Nov 2023 04:56 )   PT: 11.9 sec;   INR: 1.05 ratio         PTT - ( 19 Nov 2023 04:56 )  PTT:27.7 sec    A/P: 78y Female with R hip fracture    Plan for IMN  w/ Dr. Heller 11/19/23 in AM 0800  NPO for procedure  IVF while NPO  Hold chemical VTE ppx  CXR/EKG  Preop labs reviewed / ordered  CBC/BMP/PT/PTT/INR/T&S  Pain control  NWB RLE, bedrest  FU labs/imaging  Ca/Vit D  Outpt osteoporosis workup  Medical risk stratification for OR documented in chart   
HPI: 77 yo F w/ PMHx HTN, dementia, osteoporosis, GERD presents to the ER from [facility] after a likely mechanical fall. As per documents witnessed fall out of bed however patient does not remember how it occurred. She states at home she usually does not need to use a cane but sometimes she has to. She currently states pain is well controlled, denies any other complaints now or at home including chest pain, sob, nausea/vomiting, dizziness, palpitations.    Interval Hx: Patient seen and examined today, awake, alert, know name only, feels well, no reports of pain. Follows commands. Seen by PT, GREG recommended     ROS: Unable to adequately obtain 2/2 dementia     Vital Signs Last 24 Hrs  T(C): 36.8 (20 Nov 2023 08:32), Max: 37.1 (19 Nov 2023 21:41)  T(F): 98.2 (20 Nov 2023 08:32), Max: 98.8 (19 Nov 2023 21:41)  HR: 80 (20 Nov 2023 08:32) (80 - 109)  BP: 99/69 (20 Nov 2023 08:32) (99/69 - 114/83)  RR: 18 (20 Nov 2023 08:32) (17 - 18)  SpO2: 94% (20 Nov 2023 08:32) (93% - 100%)    Parameters below as of 20 Nov 2023 08:32  Patient On (Oxygen Delivery Method): room air    PHYSICAL EXAM:  GENERAL: NAD, lying in bed comfortably  HEAD:  Atraumatic, Normocephalic  EYES: conjunctiva and sclera clear  ENT: Moist mucous membranes  NECK: Supple, No JVD  CHEST/LUNG: Clear to auscultation bilaterally; No rales, rhonchi, wheezing. Unlabored respirations  HEART: Regular rate and rhythm; No murmurs  ABDOMEN: Bowel sounds present; Soft, Nontender, Nondistended.   EXTREMITIES:  2+ Peripheral Pulses, brisk capillary refill. No clubbing, cyanosis, or edema  NERVOUS SYSTEM:  Alert & Oriented X1, speech clear.     MEDICATIONS  (STANDING):  acetaminophen     Tablet .. 650 milliGRAM(s) Oral every 6 hours  calcium carbonate    500 mG (Tums) Chewable 2 Tablet(s) Chew daily  cyanocobalamin 500 MICROGram(s) Oral daily  donepezil 10 milliGRAM(s) Oral at bedtime  enoxaparin Injectable 40 milliGRAM(s) SubCutaneous every 24 hours  ergocalciferol 16293 Unit(s) Oral <User Schedule>  famotidine    Tablet 40 milliGRAM(s) Oral at bedtime  folic acid 1 milliGRAM(s) Oral daily  levothyroxine 50 MICROGram(s) Oral daily  metoprolol succinate ER 50 milliGRAM(s) Oral daily  naloxone Injectable 0.4 milliGRAM(s) IV Push once  polyethylene glycol 3350 17 Gram(s) Oral daily  senna 2 Tablet(s) Oral at bedtime  sertraline 100 milliGRAM(s) Oral daily    MEDICATIONS  (PRN):  aluminum hydroxide/magnesium hydroxide/simethicone Suspension 30 milliLiter(s) Oral every 4 hours PRN Dyspepsia  bisacodyl 5 milliGRAM(s) Oral daily PRN Constipation  magnesium hydroxide Suspension 30 milliLiter(s) Oral daily PRN Constipation  melatonin 3 milliGRAM(s) Oral at bedtime PRN Insomnia  ondansetron Injectable 4 milliGRAM(s) IV Push every 8 hours PRN Nausea and/or Vomiting  oxyCODONE    IR 5 milliGRAM(s) Oral every 6 hours PRN Severe Pain (7 - 10)  oxyCODONE    IR 2.5 milliGRAM(s) Oral every 6 hours PRN Moderate Pain (4 - 6)  traMADol 25 milliGRAM(s) Oral every 6 hours PRN Mild Pain (1 - 3)          LABS:                          9.2    9.72  )-----------( 221      ( 20 Nov 2023 06:43 )             27.6   11-20    141  |  109<H>  |  27<H>  ----------------------------<  128<H>  4.1   |  24  |  0.59    Ca    8.3<L>      20 Nov 2023 06:43              Urinalysis Basic - ( 19 Nov 2023 10:03 )    Color: x / Appearance: x / SG: x / pH: x  Gluc: 188 mg/dL / Ketone: x  / Bili: x / Urobili: x   Blood: x / Protein: x / Nitrite: x   Leuk Esterase: x / RBC: x / WBC x   Sq Epi: x / Non Sq Epi: x / Bacteria: x        RADIOLOGY:      < from: CT 3D Reconstruct w/o Workstation (11.18.23 @ 18:08) >  IMPRESSION:    Acute right femoral intertrochanteric fracture as described. No obvious   discrete lesion identified at the fracture margin, however, complete   evaluation is difficult. If there is continued clinical suspicion,   follow-up (MR and/or bone scan) may be obtained for further evaluation.    Age-indeterminate endplate depression of the visualized lumbar spine and   contour deformity of the sacrum, suggesting chronic. Recommend comparison   to previous outside study and follow-up as indicated.    Indeterminate left adnexal lesion. Neoplasm cannot be excluded in a   postmenopausal patient. Recommend follow-up.        
HPI: 79 yo F w/ PMHx HTN, dementia, osteoporosis, GERD presents to the ER from [facility] after a likely mechanical fall. As per documents witnessed fall out of bed however patient does not remember how it occurred. She states at home she usually does not need to use a cane but sometimes she has to. She currently states pain is well controlled, denies any other complaints now or at home including chest pain, sob, nausea/vomiting, dizziness, palpitations.    Interval Hx: Patient seen and examined, s/p OR today for R IMN  w/ Dr. Heller. Patient awake, alert, feels well, other than sore throat, knows name only.     ROS: Unable to adequately obtain 2/2 dementia       MEDICATIONS  (STANDING):  acetaminophen     Tablet .. 1000 milliGRAM(s) Oral every 8 hours  calcium carbonate    500 mG (Tums) Chewable 2 Tablet(s) Chew daily  cyanocobalamin 500 MICROGram(s) Oral daily  donepezil 10 milliGRAM(s) Oral at bedtime  ergocalciferol 20815 Unit(s) Oral <User Schedule>  famotidine    Tablet 40 milliGRAM(s) Oral at bedtime  folic acid 1 milliGRAM(s) Oral daily  levothyroxine 50 MICROGram(s) Oral daily  metoprolol succinate ER 50 milliGRAM(s) Oral daily  naloxone Injectable 0.4 milliGRAM(s) IV Push once  polyethylene glycol 3350 17 Gram(s) Oral daily  senna 2 Tablet(s) Oral at bedtime  sertraline 100 milliGRAM(s) Oral daily  sodium chloride 0.9%. 1000 milliLiter(s) (75 mL/Hr) IV Continuous <Continuous>    MEDICATIONS  (PRN):  aluminum hydroxide/magnesium hydroxide/simethicone Suspension 30 milliLiter(s) Oral every 4 hours PRN Dyspepsia  bisacodyl 5 milliGRAM(s) Oral daily PRN Constipation  melatonin 3 milliGRAM(s) Oral at bedtime PRN Insomnia  morphine  - Injectable 1 milliGRAM(s) IV Push every 4 hours PRN Moderate Pain (4 - 6)  morphine  - Injectable 2 milliGRAM(s) IV Push every 4 hours PRN Severe Pain (7 - 10)  ondansetron Injectable 4 milliGRAM(s) IV Push every 8 hours PRN Nausea and/or Vomiting      Vital Signs Last 24 Hrs  T(C): 36.8 (19 Nov 2023 12:03), Max: 37.1 (18 Nov 2023 20:50)  T(F): 98.3 (19 Nov 2023 12:03), Max: 98.8 (18 Nov 2023 20:50)  HR: 102 (19 Nov 2023 12:03) (77 - 102)  BP: 110/81 (19 Nov 2023 12:03) (110/81 - 143/87)  BP(mean): 82 (18 Nov 2023 20:21) (82 - 89)  RR: 17 (19 Nov 2023 12:03) (12 - 18)  SpO2: 100% (19 Nov 2023 12:03) (93% - 100%)    Parameters below as of 19 Nov 2023 12:03  Patient On (Oxygen Delivery Method): room air    PHYSICAL EXAM:  GENERAL: NAD, lying in bed comfortably  HEAD:  Atraumatic, Normocephalic  EYES: conjunctiva and sclera clear  ENT: Moist mucous membranes  NECK: Supple, No JVD  CHEST/LUNG: Clear to auscultation bilaterally; No rales, rhonchi, wheezing. Unlabored respirations  HEART: Regular rate and rhythm; No murmurs  ABDOMEN: Bowel sounds present; Soft, Nontender, Nondistended.   EXTREMITIES:  2+ Peripheral Pulses, brisk capillary refill. No clubbing, cyanosis, or edema  NERVOUS SYSTEM:  Alert & Oriented X1, speech clear.             LABS:                          10.7   7.50  )-----------( 306      ( 19 Nov 2023 10:03 )             31.7     19 Nov 2023 10:03    141    |  109    |  27     ----------------------------<  188    3.6     |  25     |  0.66     Ca    8.1        19 Nov 2023 10:03    TPro  7.5    /  Alb  3.4    /  TBili  0.3    /  DBili  x      /  AST  11     /  ALT  11     /  AlkPhos  77     18 Nov 2023 14:53    LIVER FUNCTIONS - ( 18 Nov 2023 14:53 )  Alb: 3.4 g/dL / Pro: 7.5 gm/dL / ALK PHOS: 77 U/L / ALT: 11 U/L / AST: 11 U/L / GGT: x           PT/INR - ( 19 Nov 2023 04:56 )   PT: 11.9 sec;   INR: 1.05 ratio         PTT - ( 19 Nov 2023 04:56 )  PTT:27.7 sec  CAPILLARY BLOOD GLUCOSE            Urinalysis Basic - ( 19 Nov 2023 10:03 )    Color: x / Appearance: x / SG: x / pH: x  Gluc: 188 mg/dL / Ketone: x  / Bili: x / Urobili: x   Blood: x / Protein: x / Nitrite: x   Leuk Esterase: x / RBC: x / WBC x   Sq Epi: x / Non Sq Epi: x / Bacteria: x        RADIOLOGY:      < from: CT 3D Reconstruct w/o Workstation (11.18.23 @ 18:08) >  IMPRESSION:    Acute right femoral intertrochanteric fracture as described. No obvious   discrete lesion identified at the fracture margin, however, complete   evaluation is difficult. If there is continued clinical suspicion,   follow-up (MR and/or bone scan) may be obtained for further evaluation.    Age-indeterminate endplate depression of the visualized lumbar spine and   contour deformity of the sacrum, suggesting chronic. Recommend comparison   to previous outside study and follow-up as indicated.    Indeterminate left adnexal lesion. Neoplasm cannot be excluded in a   postmenopausal patient.Recommend follow-up.        
HPI: 79 yo F w/ PMHx HTN, dementia, osteoporosis, GERD presents to the ER from [facility] after a likely mechanical fall. As per documents witnessed fall out of bed however patient does not remember how it occurred. She states at home she usually does not need to use a cane but sometimes she has to. She currently states pain is well controlled, denies any other complaints now or at home including chest pain, sob, nausea/vomiting, dizziness, palpitations.    Interval Hx: Patient seen and examined today, awake, alert, know name only, feels well, no reports of pain. Follows commands. Seen by PT, GREG recommended       sub: Hgb 8.3. weak.     ROS: Unable to adequately obtain 2/2 dementia     ICU Vital Signs Last 24 Hrs  T(C): 36.9 (21 Nov 2023 08:03), Max: 37 (20 Nov 2023 16:03)  T(F): 98.5 (21 Nov 2023 08:03), Max: 98.6 (20 Nov 2023 16:03)  HR: 83 (21 Nov 2023 08:03) (83 - 93)  BP: 122/77 (21 Nov 2023 08:03) (112/72 - 122/77)  BP(mean): --  ABP: --  ABP(mean): --  RR: 18 (21 Nov 2023 08:03) (18 - 18)  SpO2: 98% (21 Nov 2023 08:03) (94% - 98%)    O2 Parameters below as of 21 Nov 2023 08:03  Patient On (Oxygen Delivery Method): room air            PHYSICAL EXAM:  GENERAL: NAD, lying in bed comfortably  HEAD:  Atraumatic, Normocephalic  EYES: conjunctiva and sclera clear  ENT: Moist mucous membranes  NECK: Supple, No JVD  CHEST/LUNG: Clear to auscultation bilaterally; No rales, rhonchi, wheezing. Unlabored respirations  HEART: Regular rate and rhythm; No murmurs  ABDOMEN: Bowel sounds present; Soft, Nontender, Nondistended.   EXTREMITIES:  2+ Peripheral Pulses, brisk capillary refill. No clubbing, cyanosis, or edema  NERVOUS SYSTEM:  Alert & Oriented X1, speech clear.     MEDICATIONS  (STANDING):  acetaminophen     Tablet .. 650 milliGRAM(s) Oral every 6 hours  calcium carbonate    500 mG (Tums) Chewable 2 Tablet(s) Chew daily  cyanocobalamin 500 MICROGram(s) Oral daily  donepezil 10 milliGRAM(s) Oral at bedtime  enoxaparin Injectable 40 milliGRAM(s) SubCutaneous every 24 hours  ergocalciferol 99260 Unit(s) Oral <User Schedule>  famotidine    Tablet 40 milliGRAM(s) Oral at bedtime  folic acid 1 milliGRAM(s) Oral daily  levothyroxine 50 MICROGram(s) Oral daily  metoprolol succinate ER 50 milliGRAM(s) Oral daily  naloxone Injectable 0.4 milliGRAM(s) IV Push once  polyethylene glycol 3350 17 Gram(s) Oral daily  senna 2 Tablet(s) Oral at bedtime  sertraline 100 milliGRAM(s) Oral daily    MEDICATIONS  (PRN):  aluminum hydroxide/magnesium hydroxide/simethicone Suspension 30 milliLiter(s) Oral every 4 hours PRN Dyspepsia  bisacodyl 5 milliGRAM(s) Oral daily PRN Constipation  magnesium hydroxide Suspension 30 milliLiter(s) Oral daily PRN Constipation  melatonin 3 milliGRAM(s) Oral at bedtime PRN Insomnia  ondansetron Injectable 4 milliGRAM(s) IV Push every 8 hours PRN Nausea and/or Vomiting  oxyCODONE    IR 5 milliGRAM(s) Oral every 6 hours PRN Severe Pain (7 - 10)  oxyCODONE    IR 2.5 milliGRAM(s) Oral every 6 hours PRN Moderate Pain (4 - 6)  traMADol 25 milliGRAM(s) Oral every 6 hours PRN Mild Pain (1 - 3)          LABS:                          9.2    9.72  )-----------( 221      ( 20 Nov 2023 06:43 )             27.6   11-20    141  |  109<H>  |  27<H>  ----------------------------<  128<H>  4.1   |  24  |  0.59    Ca    8.3<L>      20 Nov 2023 06:43              Urinalysis Basic - ( 19 Nov 2023 10:03 )    Color: x / Appearance: x / SG: x / pH: x  Gluc: 188 mg/dL / Ketone: x  / Bili: x / Urobili: x   Blood: x / Protein: x / Nitrite: x   Leuk Esterase: x / RBC: x / WBC x   Sq Epi: x / Non Sq Epi: x / Bacteria: x        RADIOLOGY:      < from: CT 3D Reconstruct w/o Workstation (11.18.23 @ 18:08) >  IMPRESSION:    Acute right femoral intertrochanteric fracture as described. No obvious   discrete lesion identified at the fracture margin, however, complete   evaluation is difficult. If there is continued clinical suspicion,   follow-up (MR and/or bone scan) may be obtained for further evaluation.    Age-indeterminate endplate depression of the visualized lumbar spine and   contour deformity of the sacrum, suggesting chronic. Recommend comparison   to previous outside study and follow-up as indicated.    Indeterminate left adnexal lesion. Neoplasm cannot be excluded in a   postmenopausal patient. Recommend follow-up.        
HPI: 77 yo F w/ PMHx HTN, dementia, osteoporosis, GERD presents to the ER from [facility] after a likely mechanical fall. As per documents witnessed fall out of bed however patient does not remember how it occurred. She states at home she usually does not need to use a cane but sometimes she has to. She currently states pain is well controlled, denies any other complaints now or at home including chest pain, sob, nausea/vomiting, dizziness, palpitations.    Interval Hx: Patient seen and examined today, awake, alert, know name only, feels well, no reports of pain. Follows commands. Seen by PT, GREG recommended       sub: Hgb 8.3. weak.     ROS: Unable to adequately obtain 2/2 dementia     ICU Vital Signs Last 24 Hrs  T(C): 36.9 (21 Nov 2023 08:03), Max: 37 (20 Nov 2023 16:03)  T(F): 98.5 (21 Nov 2023 08:03), Max: 98.6 (20 Nov 2023 16:03)  HR: 83 (21 Nov 2023 08:03) (83 - 93)  BP: 122/77 (21 Nov 2023 08:03) (112/72 - 122/77)  BP(mean): --  ABP: --  ABP(mean): --  RR: 18 (21 Nov 2023 08:03) (18 - 18)  SpO2: 98% (21 Nov 2023 08:03) (94% - 98%)    O2 Parameters below as of 21 Nov 2023 08:03  Patient On (Oxygen Delivery Method): room air            PHYSICAL EXAM:  GENERAL: NAD, lying in bed comfortably  HEAD:  Atraumatic, Normocephalic  EYES: conjunctiva and sclera clear  ENT: Moist mucous membranes  NECK: Supple, No JVD  CHEST/LUNG: Clear to auscultation bilaterally; No rales, rhonchi, wheezing. Unlabored respirations  HEART: Regular rate and rhythm; No murmurs  ABDOMEN: Bowel sounds present; Soft, Nontender, Nondistended.   EXTREMITIES:  2+ Peripheral Pulses, brisk capillary refill. No clubbing, cyanosis, or edema  NERVOUS SYSTEM:  Alert & Oriented X1, speech clear.     MEDICATIONS  (STANDING):  acetaminophen     Tablet .. 650 milliGRAM(s) Oral every 6 hours  calcium carbonate    500 mG (Tums) Chewable 2 Tablet(s) Chew daily  cyanocobalamin 500 MICROGram(s) Oral daily  donepezil 10 milliGRAM(s) Oral at bedtime  enoxaparin Injectable 40 milliGRAM(s) SubCutaneous every 24 hours  ergocalciferol 33071 Unit(s) Oral <User Schedule>  famotidine    Tablet 40 milliGRAM(s) Oral at bedtime  folic acid 1 milliGRAM(s) Oral daily  levothyroxine 50 MICROGram(s) Oral daily  metoprolol succinate ER 50 milliGRAM(s) Oral daily  naloxone Injectable 0.4 milliGRAM(s) IV Push once  polyethylene glycol 3350 17 Gram(s) Oral daily  senna 2 Tablet(s) Oral at bedtime  sertraline 100 milliGRAM(s) Oral daily    MEDICATIONS  (PRN):  aluminum hydroxide/magnesium hydroxide/simethicone Suspension 30 milliLiter(s) Oral every 4 hours PRN Dyspepsia  bisacodyl 5 milliGRAM(s) Oral daily PRN Constipation  magnesium hydroxide Suspension 30 milliLiter(s) Oral daily PRN Constipation  melatonin 3 milliGRAM(s) Oral at bedtime PRN Insomnia  ondansetron Injectable 4 milliGRAM(s) IV Push every 8 hours PRN Nausea and/or Vomiting  oxyCODONE    IR 5 milliGRAM(s) Oral every 6 hours PRN Severe Pain (7 - 10)  oxyCODONE    IR 2.5 milliGRAM(s) Oral every 6 hours PRN Moderate Pain (4 - 6)  traMADol 25 milliGRAM(s) Oral every 6 hours PRN Mild Pain (1 - 3)          LABS:                          9.2    9.72  )-----------( 221      ( 20 Nov 2023 06:43 )             27.6   11-20    141  |  109<H>  |  27<H>  ----------------------------<  128<H>  4.1   |  24  |  0.59    Ca    8.3<L>      20 Nov 2023 06:43              Urinalysis Basic - ( 19 Nov 2023 10:03 )    Color: x / Appearance: x / SG: x / pH: x  Gluc: 188 mg/dL / Ketone: x  / Bili: x / Urobili: x   Blood: x / Protein: x / Nitrite: x   Leuk Esterase: x / RBC: x / WBC x   Sq Epi: x / Non Sq Epi: x / Bacteria: x        RADIOLOGY:      < from: CT 3D Reconstruct w/o Workstation (11.18.23 @ 18:08) >  IMPRESSION:    Acute right femoral intertrochanteric fracture as described. No obvious   discrete lesion identified at the fracture margin, however, complete   evaluation is difficult. If there is continued clinical suspicion,   follow-up (MR and/or bone scan) may be obtained for further evaluation.    Age-indeterminate endplate depression of the visualized lumbar spine and   contour deformity of the sacrum, suggesting chronic. Recommend comparison   to previous outside study and follow-up as indicated.    Indeterminate left adnexal lesion. Neoplasm cannot be excluded in a   postmenopausal patient. Recommend follow-up.        
HPI: 77 yo F w/ PMHx HTN, dementia, osteoporosis, GERD presents to the ER from [facility] after a likely mechanical fall. As per documents witnessed fall out of bed however patient does not remember how it occurred. She states at home she usually does not need to use a cane but sometimes she has to. She currently states pain is well controlled, denies any other complaints now or at home including chest pain, sob, nausea/vomiting, dizziness, palpitations.    Interval Hx: Patient seen and examined today, awake, alert, know name only, feels well, no reports of pain. Follows commands. Seen by PT, GREG recommended       sub: Hgb 8.9    ROS: Unable to adequately obtain 2/2 dementia     ICU Vital Signs Last 24 Hrs  T(C): 36.9 (22 Nov 2023 07:45), Max: 37.1 (21 Nov 2023 17:23)  T(F): 98.4 (22 Nov 2023 07:45), Max: 98.8 (21 Nov 2023 17:23)  HR: 77 (22 Nov 2023 07:45) (77 - 95)  BP: 92/57 (22 Nov 2023 07:45) (92/57 - 128/71)  BP(mean): --  ABP: --  ABP(mean): --  RR: 18 (22 Nov 2023 07:45) (18 - 18)  SpO2: 94% (22 Nov 2023 07:45) (93% - 94%)    O2 Parameters below as of 22 Nov 2023 07:45  Patient On (Oxygen Delivery Method): nasal cannula, 2                  PHYSICAL EXAM:  GENERAL: NAD, lying in bed comfortably  HEAD:  Atraumatic, Normocephalic  EYES: conjunctiva and sclera clear  ENT: Moist mucous membranes  NECK: Supple, No JVD  CHEST/LUNG: Clear to auscultation bilaterally; No rales, rhonchi, wheezing. Unlabored respirations  HEART: Regular rate and rhythm; No murmurs  ABDOMEN: Bowel sounds present; Soft, Nontender, Nondistended.   EXTREMITIES:  2+ Peripheral Pulses, brisk capillary refill. No clubbing, cyanosis, or edema  NERVOUS SYSTEM:  Alert & Oriented X1, speech clear.     MEDICATIONS  (STANDING):  acetaminophen     Tablet .. 650 milliGRAM(s) Oral every 6 hours  calcium carbonate    500 mG (Tums) Chewable 2 Tablet(s) Chew daily  cyanocobalamin 500 MICROGram(s) Oral daily  donepezil 10 milliGRAM(s) Oral at bedtime  enoxaparin Injectable 40 milliGRAM(s) SubCutaneous every 24 hours  ergocalciferol 89506 Unit(s) Oral <User Schedule>  famotidine    Tablet 40 milliGRAM(s) Oral at bedtime  folic acid 1 milliGRAM(s) Oral daily  levothyroxine 50 MICROGram(s) Oral daily  metoprolol succinate ER 50 milliGRAM(s) Oral daily  naloxone Injectable 0.4 milliGRAM(s) IV Push once  polyethylene glycol 3350 17 Gram(s) Oral daily  senna 2 Tablet(s) Oral at bedtime  sertraline 100 milliGRAM(s) Oral daily    MEDICATIONS  (PRN):  aluminum hydroxide/magnesium hydroxide/simethicone Suspension 30 milliLiter(s) Oral every 4 hours PRN Dyspepsia  bisacodyl 5 milliGRAM(s) Oral daily PRN Constipation  magnesium hydroxide Suspension 30 milliLiter(s) Oral daily PRN Constipation  melatonin 3 milliGRAM(s) Oral at bedtime PRN Insomnia  ondansetron Injectable 4 milliGRAM(s) IV Push every 8 hours PRN Nausea and/or Vomiting  oxyCODONE    IR 5 milliGRAM(s) Oral every 6 hours PRN Severe Pain (7 - 10)  oxyCODONE    IR 2.5 milliGRAM(s) Oral every 6 hours PRN Moderate Pain (4 - 6)  traMADol 25 milliGRAM(s) Oral every 6 hours PRN Mild Pain (1 - 3)          LABS:                          9.2    9.72  )-----------( 221      ( 20 Nov 2023 06:43 )             27.6   11-20    141  |  109<H>  |  27<H>  ----------------------------<  128<H>  4.1   |  24  |  0.59    Ca    8.3<L>      20 Nov 2023 06:43              Urinalysis Basic - ( 19 Nov 2023 10:03 )    Color: x / Appearance: x / SG: x / pH: x  Gluc: 188 mg/dL / Ketone: x  / Bili: x / Urobili: x   Blood: x / Protein: x / Nitrite: x   Leuk Esterase: x / RBC: x / WBC x   Sq Epi: x / Non Sq Epi: x / Bacteria: x        RADIOLOGY:      < from: CT 3D Reconstruct w/o Workstation (11.18.23 @ 18:08) >  IMPRESSION:    Acute right femoral intertrochanteric fracture as described. No obvious   discrete lesion identified at the fracture margin, however, complete   evaluation is difficult. If there is continued clinical suspicion,   follow-up (MR and/or bone scan) may be obtained for further evaluation.    Age-indeterminate endplate depression of the visualized lumbar spine and   contour deformity of the sacrum, suggesting chronic. Recommend comparison   to previous outside study and follow-up as indicated.    Indeterminate left adnexal lesion. Neoplasm cannot be excluded in a   postmenopausal patient. Recommend follow-up.

## 2023-11-22 NOTE — PROGRESS NOTE ADULT - ASSESSMENT
Closed R intertrochanteric fracture s/p mechanical fall   -S/p IMN with Dr. Heller 11/19  -pain control with Tylenol and prn morphine  -on Lovenox x 30 days for dvt ppx    Anemia - likely 2/2 blood loss post op  -trend h/h  -hgb 8.3  - Dtr/hcp consented to prbc  - s/p 1 unit        Hypothyroidism  -clinically euthyroid cont home dose synthroid 50mcg daily    hx of dementia and depression, cont home dose Aricept and zoloft    GERD  -controlled on H2 blockers famotidine 40mg daily     Leukocytosis likely reactive in setting of hip fracture, no clinical s/s of infection will monitor       PT eval noted: GREG recommended       dc to [facility]    GOC: Signed DNR/I with HCP Alexandria DEMPSEY in chart  Time spent: 19 min

## 2023-11-22 NOTE — DISCHARGE NOTE NURSING/CASE MANAGEMENT/SOCIAL WORK - PATIENT PORTAL LINK FT
You can access the FollowMyHealth Patient Portal offered by [facility] by registering at the following website: [email or web address]. By joining [facility]’s FollowMyHealth portal, you will also be able to view your health information using other applications (apps) compatible with our system. done

## 2023-12-04 DIAGNOSIS — E43 UNSPECIFIED SEVERE PROTEIN-CALORIE MALNUTRITION: ICD-10-CM

## 2023-12-04 DIAGNOSIS — M81.0 AGE-RELATED OSTEOPOROSIS WITHOUT CURRENT PATHOLOGICAL FRACTURE: ICD-10-CM

## 2023-12-04 DIAGNOSIS — S72.141A DISPLACED INTERTROCHANTERIC FRACTURE OF RIGHT FEMUR, INITIAL ENCOUNTER FOR CLOSED FRACTURE: ICD-10-CM

## 2023-12-04 DIAGNOSIS — I10 ESSENTIAL (PRIMARY) HYPERTENSION: ICD-10-CM

## 2023-12-04 DIAGNOSIS — D72.829 ELEVATED WHITE BLOOD CELL COUNT, UNSPECIFIED: ICD-10-CM

## 2023-12-04 DIAGNOSIS — E03.9 HYPOTHYROIDISM, UNSPECIFIED: ICD-10-CM

## 2023-12-04 DIAGNOSIS — K21.9 GASTRO-ESOPHAGEAL REFLUX DISEASE WITHOUT ESOPHAGITIS: ICD-10-CM

## 2023-12-04 DIAGNOSIS — R73.9 HYPERGLYCEMIA, UNSPECIFIED: ICD-10-CM

## 2023-12-04 DIAGNOSIS — F03.90 UNSPECIFIED DEMENTIA WITHOUT BEHAVIORAL DISTURBANCE: ICD-10-CM
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
## 2023-12-04 DIAGNOSIS — D62 ACUTE POSTHEMORRHAGIC ANEMIA: ICD-10-CM

## 2023-12-04 DIAGNOSIS — Z71.3 DIETARY COUNSELING AND SURVEILLANCE: ICD-10-CM

## 2023-12-04 DIAGNOSIS — Z88.0 ALLERGY STATUS TO PENICILLIN: ICD-10-CM
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
## 2023-12-07 PROBLEM — K21.9 GASTRO-ESOPHAGEAL REFLUX DISEASE WITHOUT ESOPHAGITIS: Chronic | Status: ACTIVE | Noted: 2023-11-18

## 2023-12-11 ENCOUNTER — APPOINTMENT (OUTPATIENT)
Dept: ORTHOPEDIC SURGERY | Facility: CLINIC | Age: 78
End: 2023-12-11
Payer: MEDICARE

## 2023-12-11 VITALS — BODY MASS INDEX: 23.09 KG/M2 | WEIGHT: 110 LBS | HEIGHT: 58 IN

## 2023-12-11 DIAGNOSIS — M81.0 AGE-RELATED OSTEOPOROSIS W/OUT CURRENT PATHOLOGICAL FRACTURE: ICD-10-CM

## 2023-12-11 DIAGNOSIS — I10 ESSENTIAL (PRIMARY) HYPERTENSION: ICD-10-CM

## 2023-12-11 DIAGNOSIS — Z87.81 OTHER SPECIFIED POSTPROCEDURAL STATES: ICD-10-CM

## 2023-12-11 DIAGNOSIS — Z86.59 PERSONAL HISTORY OF OTHER MENTAL AND BEHAVIORAL DISORDERS: ICD-10-CM

## 2023-12-11 DIAGNOSIS — Z98.890 OTHER SPECIFIED POSTPROCEDURAL STATES: ICD-10-CM

## 2023-12-11 PROCEDURE — 73502 X-RAY EXAM HIP UNI 2-3 VIEWS: CPT | Mod: 26

## 2023-12-11 PROCEDURE — 99024 POSTOP FOLLOW-UP VISIT: CPT

## 2023-12-11 RX ORDER — PANTOPRAZOLE SODIUM 40 MG/1
40 TABLET, DELAYED RELEASE ORAL
Refills: 0 | Status: ACTIVE | COMMUNITY

## 2023-12-11 RX ORDER — CALCIUM CITRATE 200 MG (950 MG) TABLET 200(950)MG
950 TABLET ORAL
Refills: 0 | Status: ACTIVE | COMMUNITY

## 2023-12-11 RX ORDER — LEVOTHYROXINE SODIUM 50 UG/1
50 CAPSULE ORAL
Refills: 0 | Status: ACTIVE | COMMUNITY

## 2023-12-11 RX ORDER — SUCRALFATE 1 G/1
1 TABLET ORAL
Refills: 0 | Status: ACTIVE | COMMUNITY

## 2023-12-11 RX ORDER — DONEPEZIL HYDROCHLORIDE 10 MG/1
10 TABLET ORAL
Refills: 0 | Status: ACTIVE | COMMUNITY

## 2023-12-11 RX ORDER — SERTRALINE HYDROCHLORIDE 100 MG/1
100 TABLET, FILM COATED ORAL
Refills: 0 | Status: ACTIVE | COMMUNITY

## 2023-12-11 RX ORDER — METOPROLOL SUCCINATE 50 MG/1
50 TABLET, EXTENDED RELEASE ORAL
Refills: 0 | Status: ACTIVE | COMMUNITY

## 2023-12-11 RX ORDER — ERGOCALCIFEROL 1.25 MG/1
1.25 MG CAPSULE, LIQUID FILLED ORAL
Refills: 0 | Status: ACTIVE | COMMUNITY

## 2023-12-11 RX ORDER — FAMOTIDINE 40 MG/1
40 TABLET, FILM COATED ORAL
Refills: 0 | Status: ACTIVE | COMMUNITY

## 2023-12-11 RX ORDER — ENOXAPARIN SODIUM 40 MG/.4ML
40 INJECTION SUBCUTANEOUS
Refills: 0 | Status: ACTIVE | COMMUNITY

## 2023-12-11 RX ORDER — TRAMADOL HYDROCHLORIDE 50 MG/1
50 TABLET, COATED ORAL
Refills: 0 | Status: ACTIVE | COMMUNITY

## 2023-12-11 RX ORDER — ACETAMINOPHEN 500 MG/1
500 TABLET ORAL
Refills: 0 | Status: ACTIVE | COMMUNITY

## 2023-12-11 RX ORDER — POLYETHYLENE GLYCOL 3350
POWDER (GRAM) MISCELLANEOUS
Refills: 0 | Status: ACTIVE | COMMUNITY

## 2023-12-11 NOTE — DISCHARGE NOTE NURSING/CASE MANAGEMENT/SOCIAL WORK - NSDCPELOVENOX_GEN_ALL_CORE
Enoxaparin/Lovenox - Compliance/Enoxaparin/Lovenox - Dietary Advice/Enoxaparin/Lovenox - Follow up monitoring/Enoxaparin/Lovenox - Potential for adverse drug reactions and interactions To get better and follow your care plan as instructed.

## 2023-12-18 ENCOUNTER — OUTPATIENT (OUTPATIENT)
Dept: OUTPATIENT SERVICES | Facility: HOSPITAL | Age: 78
LOS: 1 days | End: 2023-12-18
Payer: MEDICARE

## 2023-12-18 ENCOUNTER — APPOINTMENT (OUTPATIENT)
Dept: CT IMAGING | Facility: CLINIC | Age: 78
End: 2023-12-18
Payer: MEDICARE

## 2023-12-18 ENCOUNTER — RESULT REVIEW (OUTPATIENT)
Age: 78
End: 2023-12-18

## 2023-12-18 DIAGNOSIS — Z98.890 OTHER SPECIFIED POSTPROCEDURAL STATES: ICD-10-CM

## 2023-12-18 PROCEDURE — 73700 CT LOWER EXTREMITY W/O DYE: CPT | Mod: 26,RT

## 2023-12-18 PROCEDURE — 76376 3D RENDER W/INTRP POSTPROCES: CPT

## 2023-12-18 PROCEDURE — 76376 3D RENDER W/INTRP POSTPROCES: CPT | Mod: 26

## 2023-12-18 PROCEDURE — 73700 CT LOWER EXTREMITY W/O DYE: CPT

## 2024-01-29 ENCOUNTER — APPOINTMENT (OUTPATIENT)
Dept: ORTHOPEDIC SURGERY | Facility: CLINIC | Age: 79
End: 2024-01-29
Payer: MEDICARE

## 2024-01-29 VITALS — BODY MASS INDEX: 23.09 KG/M2 | HEIGHT: 58 IN | WEIGHT: 110 LBS

## 2024-01-29 DIAGNOSIS — M17.11 UNILATERAL PRIMARY OSTEOARTHRITIS, RIGHT KNEE: ICD-10-CM

## 2024-01-29 DIAGNOSIS — S72.141D DISPLACED INTERTROCHANTERIC FRACTURE OF RIGHT FEMUR, SUBSEQUENT ENCOUNTER FOR CLOSED FRACTURE WITH ROUTINE HEALING: ICD-10-CM

## 2024-01-29 PROCEDURE — 73564 X-RAY EXAM KNEE 4 OR MORE: CPT | Mod: RT

## 2024-01-29 PROCEDURE — 99024 POSTOP FOLLOW-UP VISIT: CPT

## 2024-01-29 PROCEDURE — 73503 X-RAY EXAM HIP UNI 4/> VIEWS: CPT | Mod: RT

## 2024-01-29 RX ORDER — SUCRALFATE 1 G/1
1 TABLET ORAL
Refills: 0 | Status: ACTIVE | COMMUNITY

## 2024-01-29 RX ORDER — CHOLECALCIFEROL (VITAMIN D3) 125 MCG
TABLET ORAL
Refills: 0 | Status: ACTIVE | COMMUNITY

## 2024-01-29 NOTE — HISTORY OF PRESENT ILLNESS
[Dull/Aching] : dull/aching [Rest] : rest [Meds] : meds [Sitting] : sitting [Standing] : standing [Walking] : walking [5] : 5 [] : no [FreeTextEntry1] : RT hip [FreeTextEntry5] : pt here for PO#2 of the rt hip. daughter states her mother is feeling better has slight pain with some movements. . does PT about 2-3 x a week. currently in a wheelchair DOS 11/27/23 [FreeTextEntry9] : tyloenol [de-identified] : 11/27/23 [de-identified] : 11/27/23 [de-identified] : rt hip surgery [de-identified] : 11/27/23 [de-identified] : rt hip

## 2024-01-29 NOTE — ASSESSMENT
[FreeTextEntry1] : Patient is today for evaluation of her right lower extremity.  She is now 2 and half months out from her open reduction internal fixation of a right intertrochanteric hip fracture.  She is currently in assisted living.  She is doing poorly with physical therapy.  She is in a lot of pain.  She has a contracture of her right hip which is painful to movement.  She also complains of pain and crepitation in her right knee.  She is currently not ambulating.  Examination of the right lower extremity reveals normal neurovascular exam.  She has some swelling in her lower extremity from disuse.  She has significant loss of range of motion of the right hip with extreme pain during any attempts at motion.  She has no point tenderness and her scars are well-healed with no signs of infection.  Examination of the right knee is limited secondary to pain.  Range of motion is limited from 5 to 100 degrees with crepitation and pain along the lateral joint line.  There is no instability or effusion and no apprehension or maltracking.  X-rays done in the office today the AP pelvis 1 view of the right hip 2 views shows the fracture of the intertrochanteric region healing in acceptable position.  The position of the leg is internally rotated and a deducted and flexed.  The fracture is healing uneventfully and the hardware is in good position with no secondary arthritis.  The left hip is unaffected.  There are no obvious tumors, masses or calcifications seen.  X-rays done in the office today of the right knee 4 views without weightbearing show significant osteoarthritis of the lateral compartment with near bone-on-bone changes.  There were no obvious tumors, masses or calcifications seen.  Plan at this time is activity modification.  She will continue to try physical therapy to the best of her ability.  Will consider an intra-articular injection of cortisone into her right knee if right knee pain remains one of her chief complaints.  Will see her back in the office as needed.

## 2024-02-13 NOTE — DIETITIAN INITIAL EVALUATION ADULT - BUCCAL DEPLETION IS
She does not seem to be in respiratory distress.  We will order chest x-ray.  Will also order COVID testing.    2/14: Patient with significant elevated D-dimer, we have ordered CTA of the chest.  It was negative for pulmonary embolism.  It did report emphysema. We have ordered oxygen as I suspect patient will require oxygen upon discharge.   moderate